# Patient Record
Sex: MALE | Race: ASIAN | NOT HISPANIC OR LATINO
[De-identification: names, ages, dates, MRNs, and addresses within clinical notes are randomized per-mention and may not be internally consistent; named-entity substitution may affect disease eponyms.]

---

## 2019-01-18 ENCOUNTER — RECORD ABSTRACTING (OUTPATIENT)
Age: 34
End: 2019-01-18

## 2019-01-18 DIAGNOSIS — Z82.49 FAMILY HISTORY OF ISCHEMIC HEART DISEASE AND OTHER DISEASES OF THE CIRCULATORY SYSTEM: ICD-10-CM

## 2019-01-18 DIAGNOSIS — Z83.3 FAMILY HISTORY OF DIABETES MELLITUS: ICD-10-CM

## 2019-01-21 ENCOUNTER — APPOINTMENT (OUTPATIENT)
Dept: ENDOCRINOLOGY | Facility: CLINIC | Age: 34
End: 2019-01-21
Payer: COMMERCIAL

## 2019-01-21 VITALS
SYSTOLIC BLOOD PRESSURE: 120 MMHG | BODY MASS INDEX: 27.3 KG/M2 | DIASTOLIC BLOOD PRESSURE: 70 MMHG | WEIGHT: 206 LBS | HEIGHT: 73 IN | HEART RATE: 80 BPM

## 2019-01-21 DIAGNOSIS — Z78.9 OTHER SPECIFIED HEALTH STATUS: ICD-10-CM

## 2019-01-21 LAB — GLUCOSE BLDC GLUCOMTR-MCNC: 121

## 2019-01-21 PROCEDURE — 95251 CONT GLUC MNTR ANALYSIS I&R: CPT

## 2019-01-21 PROCEDURE — 99214 OFFICE O/P EST MOD 30 MIN: CPT | Mod: 25

## 2019-01-21 PROCEDURE — 82962 GLUCOSE BLOOD TEST: CPT

## 2019-01-21 NOTE — HISTORY OF PRESENT ILLNESS
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 \par prior pt of Dr Mendoza last seen him 8/2018\par "do not yell at me" per pt "I have been bad" \par        Most recent A1c up to 7.9 from 7.2%\par        Regimen: \par        Lantus 24 units SC QHS\par        metformin ER 500mg BID\par        Humalog only with bkfst 120 takes 6units\par 160 takes 1unit per 10 over 130\par dinner 8-16units same sliding scale \par        Has changed diet and cut out carb and starches. Only starch mainly in the morning with coffee and breakfast cookie. \par        Wearing Free Style lynda\par        Average Glucose 159 higher\par from freestyle lynda download has highs at 200-250 after breakfast has a cookie with breakfast every morning\par had rare lows after dinner overestimating CHO for dinner per pt \par seen Nutritionist Princess Hernandez, however does not count carbs , \par \par        Microvascular complications: \par        Nephropathy Cr 0.8, EGFR >60, wnl\par        Neuropathy symptoms, denies\par        microfilament exam Sees Dr. Shaikh\par        Retinopathy. Sees Dr. [facility] in [place] denies DR\par        Macrovascular complications: \par        HTN at goal on Ramipril. ASA 81mg PO Qdialy. \par        CAD/CVA denies \par        Lipids 69 on Crestor 10mg qhs with LFT's normal \par        thyroid US 9/15 no nodules reviewed\par

## 2019-01-21 NOTE — PHYSICAL EXAM
[Alert] : alert [No Acute Distress] : no acute distress [Well Nourished] : well nourished [Well Developed] : well developed [Normal Sclera/Conjunctiva] : normal sclera/conjunctiva [EOMI] : extra ocular movement intact [No Proptosis] : no proptosis [Normal Oropharynx] : the oropharynx was normal [Thyroid Not Enlarged] : the thyroid was not enlarged [No Thyroid Nodules] : there were no palpable thyroid nodules [No Respiratory Distress] : no respiratory distress [No Accessory Muscle Use] : no accessory muscle use [Clear to Auscultation] : lungs were clear to auscultation bilaterally [Normal Rate] : heart rate was normal  [Normal S1, S2] : normal S1 and S2 [Regular Rhythm] : with a regular rhythm [Pedal Pulses Normal] : the pedal pulses are present [No Edema] : there was no peripheral edema [Normal Bowel Sounds] : normal bowel sounds [Not Tender] : non-tender [Soft] : abdomen soft [Not Distended] : not distended [Post Cervical Nodes] : posterior cervical nodes [Anterior Cervical Nodes] : anterior cervical nodes [Axillary Nodes] : axillary nodes [No Spinal Tenderness] : no spinal tenderness [Spine Straight] : spine straight [No Stigmata of Cushings Syndrome] : no stigmata of cushings syndrome [Normal Gait] : normal gait [Normal Strength/Tone] : muscle strength and tone were normal [No Rash] : no rash [Right Foot Was Examined] : right foot ~C was examined [Left Foot Was Examined] : left foot ~C was examined [Normal] : normal [Full ROM] : with full range of motion [2+] : 2+ in the dorsalis pedis [Normal Reflexes] : deep tendon reflexes were 2+ and symmetric [No Tremors] : no tremors [Oriented x3] : oriented to person, place, and time [Acanthosis Nigricans] : no acanthosis nigricans [Diminished Throughout Both Feet] : normal tactile sensation with monofilament testing throughout both feet

## 2019-01-21 NOTE — ASSESSMENT
[FreeTextEntry1] : see freestyle lynda information downloaded and scanned for our interpretation, reviewed with the pt

## 2019-01-28 ENCOUNTER — RX RENEWAL (OUTPATIENT)
Age: 34
End: 2019-01-28

## 2019-03-20 ENCOUNTER — MEDICATION RENEWAL (OUTPATIENT)
Age: 34
End: 2019-03-20

## 2019-04-09 ENCOUNTER — APPOINTMENT (OUTPATIENT)
Dept: INTERNAL MEDICINE | Facility: CLINIC | Age: 34
End: 2019-04-09
Payer: COMMERCIAL

## 2019-04-09 VITALS
BODY MASS INDEX: 25.31 KG/M2 | OXYGEN SATURATION: 98 % | TEMPERATURE: 98.6 F | SYSTOLIC BLOOD PRESSURE: 120 MMHG | HEIGHT: 73 IN | WEIGHT: 191 LBS | DIASTOLIC BLOOD PRESSURE: 80 MMHG

## 2019-04-09 PROCEDURE — 99395 PREV VISIT EST AGE 18-39: CPT | Mod: 25

## 2019-04-09 PROCEDURE — 93000 ELECTROCARDIOGRAM COMPLETE: CPT

## 2019-04-09 PROCEDURE — 99406 BEHAV CHNG SMOKING 3-10 MIN: CPT | Mod: 25

## 2019-04-09 PROCEDURE — 36415 COLL VENOUS BLD VENIPUNCTURE: CPT

## 2019-04-09 NOTE — PHYSICAL EXAM
[No Acute Distress] : no acute distress [Well Developed] : well developed [Well Nourished] : well nourished [Well-Appearing] : well-appearing [Normal Sclera/Conjunctiva] : normal sclera/conjunctiva [PERRL] : pupils equal round and reactive to light [EOMI] : extraocular movements intact [Normal Outer Ear/Nose] : the outer ears and nose were normal in appearance [Normal Oropharynx] : the oropharynx was normal [No JVD] : no jugular venous distention [No Lymphadenopathy] : no lymphadenopathy [Supple] : supple [Thyroid Normal, No Nodules] : the thyroid was normal and there were no nodules present [No Respiratory Distress] : no respiratory distress  [Clear to Auscultation] : lungs were clear to auscultation bilaterally [No Accessory Muscle Use] : no accessory muscle use [Normal Rate] : normal rate  [Regular Rhythm] : with a regular rhythm [Normal S1, S2] : normal S1 and S2 [No Murmur] : no murmur heard [No Carotid Bruits] : no carotid bruits [No Abdominal Bruit] : a ~M bruit was not heard ~T in the abdomen [Pedal Pulses Present] : the pedal pulses are present [No Varicosities] : no varicosities [No Edema] : there was no peripheral edema [No Extremity Clubbing/Cyanosis] : no extremity clubbing/cyanosis [Soft] : abdomen soft [No Palpable Aorta] : no palpable aorta [Non-distended] : non-distended [Non Tender] : non-tender [No Masses] : no abdominal mass palpated [No HSM] : no HSM [Normal Posterior Cervical Nodes] : no posterior cervical lymphadenopathy [Normal Bowel Sounds] : normal bowel sounds [Normal Anterior Cervical Nodes] : no anterior cervical lymphadenopathy [No Joint Swelling] : no joint swelling [No CVA Tenderness] : no CVA  tenderness [No Spinal Tenderness] : no spinal tenderness [No Rash] : no rash [Grossly Normal Strength/Tone] : grossly normal strength/tone [Coordination Grossly Intact] : coordination grossly intact [No Focal Deficits] : no focal deficits [Normal Gait] : normal gait [Deep Tendon Reflexes (DTR)] : deep tendon reflexes were 2+ and symmetric [Normal Affect] : the affect was normal [Normal Insight/Judgement] : insight and judgment were intact

## 2019-04-10 NOTE — HEALTH RISK ASSESSMENT
[Two or more falls in past year] : Patient reported two or more falls in the past year [HIV Test offered] : HIV Test offered [Hepatitis C test offered] : Hepatitis C test offered [Good] : ~his/her~  mood as  good [] : No [de-identified] : social  [PapSmearDate] : never [MammogramDate] : never [ColonoscopyDate] : few years ago  [BoneDensityDate] : never

## 2019-04-10 NOTE — HISTORY OF PRESENT ILLNESS
[FreeTextEntry1] : physical  [de-identified] : patient presents to the office for an annual exam . He is still a current smoker unmotivated to stop. He reports following endocrine on a regular basis.

## 2019-04-10 NOTE — COUNSELING
[Weight management counseling provided] : Weight management [Healthy eating counseling provided] : healthy eating [Activity counseling provided] : activity [Discussed Risks and Advised to Quit Smoking] : Discussed risks and advised to quit smoking [Discussed Cessation Medication] : cessation medication was discussed [Discussed Cessation Strategies] : cessation strategies were discussed [Quit Smoking] : Quit Smoking [Tobacco Use Cessation Intermediate Greater Than 3 Minutes Up to 10 Minutes] : Tobacco Use Cessation Intermediate Greater Than 3 Minutes Up to 10 Minutes

## 2019-04-16 LAB
25(OH)D3 SERPL-MCNC: 31.4 NG/ML
ALBUMIN SERPL ELPH-MCNC: 5.2 G/DL
ALP BLD-CCNC: 55 U/L
ALT SERPL-CCNC: 36 U/L
ANION GAP SERPL CALC-SCNC: 14 MMOL/L
APPEARANCE: CLEAR
AST SERPL-CCNC: 28 U/L
BACTERIA: NEGATIVE
BASOPHILS # BLD AUTO: 0.04 K/UL
BASOPHILS NFR BLD AUTO: 0.7 %
BILIRUB SERPL-MCNC: 0.3 MG/DL
BILIRUBIN URINE: NEGATIVE
BLOOD URINE: NEGATIVE
BUN SERPL-MCNC: 12 MG/DL
CALCIUM SERPL-MCNC: 9.6 MG/DL
CHLORIDE SERPL-SCNC: 100 MMOL/L
CHOLEST SERPL-MCNC: 160 MG/DL
CHOLEST/HDLC SERPL: 4.3 RATIO
CO2 SERPL-SCNC: 27 MMOL/L
COLOR: YELLOW
CREAT SERPL-MCNC: 1.06 MG/DL
EOSINOPHIL # BLD AUTO: 0.06 K/UL
EOSINOPHIL NFR BLD AUTO: 1 %
ESTIMATED AVERAGE GLUCOSE: 157 MG/DL
GLUCOSE QUALITATIVE U: NEGATIVE
GLUCOSE SERPL-MCNC: 146 MG/DL
HBA1C MFR BLD HPLC: 7.1 %
HCT VFR BLD CALC: 47.5 %
HCV AB SER QL: NONREACTIVE
HCV S/CO RATIO: 0.16 S/CO
HDLC SERPL-MCNC: 37 MG/DL
HGB BLD-MCNC: 14.8 G/DL
HIV1+2 AB SPEC QL IA.RAPID: NONREACTIVE
HYALINE CASTS: 0 /LPF
IMM GRANULOCYTES NFR BLD AUTO: 0.3 %
KETONES URINE: NEGATIVE
LDLC SERPL CALC-MCNC: 72 MG/DL
LEUKOCYTE ESTERASE URINE: NEGATIVE
LYMPHOCYTES # BLD AUTO: 2.01 K/UL
LYMPHOCYTES NFR BLD AUTO: 32.7 %
MAN DIFF?: NORMAL
MCHC RBC-ENTMCNC: 29.7 PG
MCHC RBC-ENTMCNC: 31.2 GM/DL
MCV RBC AUTO: 95.4 FL
MICROSCOPIC-UA: NORMAL
MONOCYTES # BLD AUTO: 0.38 K/UL
MONOCYTES NFR BLD AUTO: 6.2 %
NEUTROPHILS # BLD AUTO: 3.64 K/UL
NEUTROPHILS NFR BLD AUTO: 59.1 %
NITRITE URINE: NEGATIVE
PH URINE: 5.5
PLATELET # BLD AUTO: 239 K/UL
POTASSIUM SERPL-SCNC: 3.9 MMOL/L
PROT SERPL-MCNC: 7.8 G/DL
PROTEIN URINE: NORMAL
RBC # BLD: 4.98 M/UL
RBC # FLD: 12.5 %
RED BLOOD CELLS URINE: 0 /HPF
SODIUM SERPL-SCNC: 141 MMOL/L
SPECIFIC GRAVITY URINE: 1.03
SQUAMOUS EPITHELIAL CELLS: 0 /HPF
T4 SERPL-MCNC: 6.6 UG/DL
TRIGL SERPL-MCNC: 255 MG/DL
TSH SERPL-ACNC: 3.21 UIU/ML
UROBILINOGEN URINE: NORMAL
VIT B12 SERPL-MCNC: 451 PG/ML
WBC # FLD AUTO: 6.15 K/UL
WHITE BLOOD CELLS URINE: 0 /HPF

## 2019-04-29 ENCOUNTER — APPOINTMENT (OUTPATIENT)
Dept: ENDOCRINOLOGY | Facility: CLINIC | Age: 34
End: 2019-04-29

## 2019-05-06 ENCOUNTER — APPOINTMENT (OUTPATIENT)
Dept: ENDOCRINOLOGY | Facility: CLINIC | Age: 34
End: 2019-05-06

## 2019-05-17 ENCOUNTER — APPOINTMENT (OUTPATIENT)
Dept: ENDOCRINOLOGY | Facility: CLINIC | Age: 34
End: 2019-05-17
Payer: COMMERCIAL

## 2019-05-17 VITALS
WEIGHT: 190 LBS | DIASTOLIC BLOOD PRESSURE: 90 MMHG | HEART RATE: 92 BPM | SYSTOLIC BLOOD PRESSURE: 120 MMHG | OXYGEN SATURATION: 97 % | BODY MASS INDEX: 25.18 KG/M2 | HEIGHT: 73 IN

## 2019-05-17 LAB — GLUCOSE BLDC GLUCOMTR-MCNC: 128

## 2019-05-17 PROCEDURE — 95251 CONT GLUC MNTR ANALYSIS I&R: CPT | Mod: 59

## 2019-05-17 PROCEDURE — 82962 GLUCOSE BLOOD TEST: CPT

## 2019-05-17 PROCEDURE — 99215 OFFICE O/P EST HI 40 MIN: CPT | Mod: 25

## 2019-05-17 PROCEDURE — 99406 BEHAV CHNG SMOKING 3-10 MIN: CPT

## 2019-05-17 NOTE — REASON FOR VISIT
[Initial Eval - Existing Diagnosis] : an initial evaluation of an existing diagnosis [Follow-Up: _____] : a [unfilled] follow-up visit

## 2019-05-17 NOTE — ASSESSMENT
[FreeTextEntry1] : CGM data worth       days reviewed \par dated from \par s/o \par reviewed and scanned in the system \par Adv \par discussed with the patient\par

## 2019-05-17 NOTE — DATA REVIEWED
[FreeTextEntry1] : CGM data worth 13      days reviewed \par dated from ma4th 2019- to may 17th 2019 \par s/o good control \par reviewed and scanned in the system \par Adv high protein breakfast avoid simple carbs \par discussed with the patient\par

## 2019-05-28 LAB
25(OH)D3 SERPL-MCNC: 36.6 NG/ML
ALBUMIN SERPL ELPH-MCNC: 5 G/DL
ALP BLD-CCNC: 56 U/L
ALT SERPL-CCNC: 21 U/L
ANION GAP SERPL CALC-SCNC: 14 MMOL/L
AST SERPL-CCNC: 17 U/L
BILIRUB SERPL-MCNC: 0.4 MG/DL
BUN SERPL-MCNC: 14 MG/DL
CALCIUM SERPL-MCNC: 9.6 MG/DL
CHLORIDE SERPL-SCNC: 103 MMOL/L
CHOLEST SERPL-MCNC: 125 MG/DL
CHOLEST/HDLC SERPL: 3.8 RATIO
CO2 SERPL-SCNC: 26 MMOL/L
CREAT SERPL-MCNC: 0.83 MG/DL
CREAT SPEC-SCNC: 172 MG/DL
GLUCOSE SERPL-MCNC: 147 MG/DL
HBA1C MFR BLD HPLC: 7 %
HDLC SERPL-MCNC: 33 MG/DL
LDLC SERPL CALC-MCNC: 70 MG/DL
MICROALBUMIN 24H UR DL<=1MG/L-MCNC: 7.4 MG/DL
MICROALBUMIN/CREAT 24H UR-RTO: 43 MG/G
POTASSIUM SERPL-SCNC: 4.4 MMOL/L
PROT SERPL-MCNC: 7.7 G/DL
SODIUM SERPL-SCNC: 143 MMOL/L
TRIGL SERPL-MCNC: 111 MG/DL

## 2019-08-09 ENCOUNTER — RX RENEWAL (OUTPATIENT)
Age: 34
End: 2019-08-09

## 2019-08-09 ENCOUNTER — MEDICATION RENEWAL (OUTPATIENT)
Age: 34
End: 2019-08-09

## 2019-08-10 ENCOUNTER — LABORATORY RESULT (OUTPATIENT)
Age: 34
End: 2019-08-10

## 2019-08-19 ENCOUNTER — APPOINTMENT (OUTPATIENT)
Dept: ENDOCRINOLOGY | Facility: CLINIC | Age: 34
End: 2019-08-19
Payer: COMMERCIAL

## 2019-08-19 VITALS
BODY MASS INDEX: 25.71 KG/M2 | DIASTOLIC BLOOD PRESSURE: 80 MMHG | WEIGHT: 194 LBS | HEART RATE: 83 BPM | OXYGEN SATURATION: 98 % | SYSTOLIC BLOOD PRESSURE: 140 MMHG | HEIGHT: 73 IN

## 2019-08-19 LAB — GLUCOSE BLDC GLUCOMTR-MCNC: 110

## 2019-08-19 PROCEDURE — 99215 OFFICE O/P EST HI 40 MIN: CPT | Mod: 25

## 2019-08-19 PROCEDURE — 82962 GLUCOSE BLOOD TEST: CPT

## 2019-08-20 NOTE — PHYSICAL EXAM
[No Acute Distress] : no acute distress [Alert] : alert [Well Nourished] : well nourished [Well Developed] : well developed [Normal Sclera/Conjunctiva] : normal sclera/conjunctiva [EOMI] : extra ocular movement intact [No Proptosis] : no proptosis [Normal Oropharynx] : the oropharynx was normal [Thyroid Not Enlarged] : the thyroid was not enlarged [No Thyroid Nodules] : there were no palpable thyroid nodules [No Respiratory Distress] : no respiratory distress [No Accessory Muscle Use] : no accessory muscle use [Clear to Auscultation] : lungs were clear to auscultation bilaterally [Normal Rate] : heart rate was normal  [Regular Rhythm] : with a regular rhythm [Normal S1, S2] : normal S1 and S2 [Pedal Pulses Normal] : the pedal pulses are present [No Edema] : there was no peripheral edema [Not Tender] : non-tender [Normal Bowel Sounds] : normal bowel sounds [Not Distended] : not distended [Post Cervical Nodes] : posterior cervical nodes [Soft] : abdomen soft [Anterior Cervical Nodes] : anterior cervical nodes [Axillary Nodes] : axillary nodes [No Stigmata of Cushings Syndrome] : no stigmata of cushings syndrome [No Spinal Tenderness] : no spinal tenderness [Spine Straight] : spine straight [Normal Strength/Tone] : muscle strength and tone were normal [Normal Gait] : normal gait [No Rash] : no rash [Left Foot Was Examined] : left foot ~C was examined [Right Foot Was Examined] : right foot ~C was examined [Normal] : normal [Full ROM] : with full range of motion [2+] : 2+ in the dorsalis pedis [Normal Reflexes] : deep tendon reflexes were 2+ and symmetric [No Tremors] : no tremors [Oriented x3] : oriented to person, place, and time [Acanthosis Nigricans] : no acanthosis nigricans [Vibration Dec.] : normal vibratory sensation at the level of the toes [Position Sense Dec.] : normal position sense at the level of the toes [Diminished Throughout Both Feet] : normal tactile sensation with monofilament testing throughout both feet [Normal Sensation on Monofilament Testing] : normal sensation on monofilament testing of lower extremities

## 2019-08-20 NOTE — HISTORY OF PRESENT ILLNESS
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 Only starch mainly in the morning with coffee and breakfast cookie. \par        Wearing Free Style lynda\par        Average Glucose 159 higher\par from freestyle lynda download has highs at 200-250 after breakfast has a cookie with breakfast every morning\par had rare lows after dinner overestimating CHO for dinner per pt \par seen Nutritionist Princess Hernandez, however does not count carbs , \par \par        Microvascular complications: \par        Nephropathy Cr 0.8, EGFR >60, wnl\par        Neuropathy symptoms, denies\par        microfilament exam Sees Dr. Shaikh\par        Retinopathy yes Sees Dr. [facility] in [place] denies DR\par        Macrovascular complications: \par        HTN at goal on Ramipril. ASA 81mg PO Qdialy. \par        CAD/CVA denies \par        Lipids 69 on Crestor 10mg qhs with LFT's normal \par        thyroid US 9/15 no nodules reviewed\par \par \par \par   \par \par \par \par \par

## 2019-09-05 ENCOUNTER — RX RENEWAL (OUTPATIENT)
Age: 34
End: 2019-09-05

## 2019-10-03 ENCOUNTER — RX RENEWAL (OUTPATIENT)
Age: 34
End: 2019-10-03

## 2019-10-04 ENCOUNTER — RX RENEWAL (OUTPATIENT)
Age: 34
End: 2019-10-04

## 2019-10-07 ENCOUNTER — RX RENEWAL (OUTPATIENT)
Age: 34
End: 2019-10-07

## 2019-11-01 ENCOUNTER — RESULT REVIEW (OUTPATIENT)
Age: 34
End: 2019-11-01

## 2019-11-20 ENCOUNTER — LABORATORY RESULT (OUTPATIENT)
Age: 34
End: 2019-11-20

## 2019-11-25 ENCOUNTER — APPOINTMENT (OUTPATIENT)
Dept: ENDOCRINOLOGY | Facility: CLINIC | Age: 34
End: 2019-11-25
Payer: COMMERCIAL

## 2019-11-25 VITALS — BODY MASS INDEX: 25.46 KG/M2 | WEIGHT: 193 LBS

## 2019-11-25 VITALS
DIASTOLIC BLOOD PRESSURE: 80 MMHG | HEIGHT: 73 IN | SYSTOLIC BLOOD PRESSURE: 122 MMHG | HEART RATE: 101 BPM | OXYGEN SATURATION: 95 %

## 2019-11-25 LAB — GLUCOSE BLDC GLUCOMTR-MCNC: 168

## 2019-11-25 PROCEDURE — 99214 OFFICE O/P EST MOD 30 MIN: CPT | Mod: 25

## 2019-11-25 PROCEDURE — 82962 GLUCOSE BLOOD TEST: CPT

## 2019-12-06 NOTE — HISTORY OF PRESENT ILLNESS
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 Only starch mainly in the morning with coffee and breakfast cookie. \par        Wearing Free Style lynda\par        Average Glucose 159 higher\par from freestyle lynda download has highs at 200-250 after breakfast has a cookie with breakfast every morning\par had rare lows after dinner overestimating CHO for dinner per pt \par seen Nutritionist Princess Hernandez, however does not count carbs , \par \par        Microvascular complications: \par        Nephropathy Cr 0.8, EGFR >60, wnl\par        Neuropathy symptoms, denies\par        microfilament exam Sees Dr. Shaikh\par        Retinopathy yes Sees Dr. [facility] in [place] denies DR\par        Macrovascular complications: \par        HTN at goal on Ramipril. ASA 81mg PO Qdialy. \par        CAD/CVA denies \par        Lipids 69 on Crestor 10mg qhs with LFT's normal \par        thyroid US 9/15 no nodules reviewed\par \par \par \par   \par \par \par \par \par

## 2019-12-12 ENCOUNTER — RX RENEWAL (OUTPATIENT)
Age: 34
End: 2019-12-12

## 2019-12-20 ENCOUNTER — RX RENEWAL (OUTPATIENT)
Age: 34
End: 2019-12-20

## 2020-02-24 ENCOUNTER — APPOINTMENT (OUTPATIENT)
Dept: INTERNAL MEDICINE | Facility: CLINIC | Age: 35
End: 2020-02-24
Payer: COMMERCIAL

## 2020-02-24 VITALS
BODY MASS INDEX: 23.86 KG/M2 | HEIGHT: 73 IN | WEIGHT: 180 LBS | HEART RATE: 82 BPM | OXYGEN SATURATION: 98 % | DIASTOLIC BLOOD PRESSURE: 70 MMHG | SYSTOLIC BLOOD PRESSURE: 100 MMHG

## 2020-02-24 DIAGNOSIS — M25.511 PAIN IN RIGHT SHOULDER: ICD-10-CM

## 2020-02-24 PROCEDURE — 99213 OFFICE O/P EST LOW 20 MIN: CPT

## 2020-02-26 PROBLEM — M25.511 RIGHT SHOULDER PAIN: Status: ACTIVE | Noted: 2020-02-26

## 2020-02-26 NOTE — HISTORY OF PRESENT ILLNESS
[FreeTextEntry8] : right shoulder pain \par \par patient was office today for right shoulder pain. He denies any specific trauma. He is wanting a prescription for physical therapy. He does not want to do imaging at this time

## 2020-02-26 NOTE — PHYSICAL EXAM
[No Acute Distress] : no acute distress [Well Nourished] : well nourished [Well Developed] : well developed [Well-Appearing] : well-appearing [Normal Sclera/Conjunctiva] : normal sclera/conjunctiva [PERRL] : pupils equal round and reactive to light [EOMI] : extraocular movements intact [Normal Outer Ear/Nose] : the outer ears and nose were normal in appearance [Normal Oropharynx] : the oropharynx was normal [No JVD] : no jugular venous distention [No Lymphadenopathy] : no lymphadenopathy [Supple] : supple [Thyroid Normal, No Nodules] : the thyroid was normal and there were no nodules present [No Respiratory Distress] : no respiratory distress  [No Accessory Muscle Use] : no accessory muscle use [Clear to Auscultation] : lungs were clear to auscultation bilaterally [Normal Rate] : normal rate  [Regular Rhythm] : with a regular rhythm [Normal S1, S2] : normal S1 and S2 [No Murmur] : no murmur heard [No Carotid Bruits] : no carotid bruits [No Abdominal Bruit] : a ~M bruit was not heard ~T in the abdomen [No Varicosities] : no varicosities [Pedal Pulses Present] : the pedal pulses are present [No Edema] : there was no peripheral edema [No Palpable Aorta] : no palpable aorta [No Extremity Clubbing/Cyanosis] : no extremity clubbing/cyanosis [Soft] : abdomen soft [Non Tender] : non-tender [Non-distended] : non-distended [No Masses] : no abdominal mass palpated [No HSM] : no HSM [Normal Bowel Sounds] : normal bowel sounds [Normal Posterior Cervical Nodes] : no posterior cervical lymphadenopathy [Normal Anterior Cervical Nodes] : no anterior cervical lymphadenopathy [No CVA Tenderness] : no CVA  tenderness [No Spinal Tenderness] : no spinal tenderness [No Joint Swelling] : no joint swelling [Grossly Normal Strength/Tone] : grossly normal strength/tone [No Rash] : no rash [Coordination Grossly Intact] : coordination grossly intact [No Focal Deficits] : no focal deficits [Normal Gait] : normal gait [Deep Tendon Reflexes (DTR)] : deep tendon reflexes were 2+ and symmetric [Normal Affect] : the affect was normal [Normal Insight/Judgement] : insight and judgment were intact [de-identified] : mild pain when lifting right shoulder above 90 degrees

## 2020-02-26 NOTE — PLAN
[FreeTextEntry1] :  prescription for physical therapy for right shoulder pain. If physical therapy is not successful will consider doing imaging and or orthopedic referral.

## 2020-03-03 ENCOUNTER — RX RENEWAL (OUTPATIENT)
Age: 35
End: 2020-03-03

## 2020-03-16 ENCOUNTER — APPOINTMENT (OUTPATIENT)
Dept: ENDOCRINOLOGY | Facility: CLINIC | Age: 35
End: 2020-03-16
Payer: COMMERCIAL

## 2020-03-16 VITALS
DIASTOLIC BLOOD PRESSURE: 70 MMHG | WEIGHT: 183 LBS | SYSTOLIC BLOOD PRESSURE: 120 MMHG | BODY MASS INDEX: 24.25 KG/M2 | HEIGHT: 73 IN

## 2020-03-16 LAB
ALBUMIN SERPL ELPH-MCNC: 5.3 G/DL
ALP BLD-CCNC: 60 U/L
ALT SERPL-CCNC: 36 U/L
ANION GAP SERPL CALC-SCNC: 15 MMOL/L
AST SERPL-CCNC: 29 U/L
BILIRUB SERPL-MCNC: 0.6 MG/DL
BUN SERPL-MCNC: 12 MG/DL
CALCIUM SERPL-MCNC: 9.6 MG/DL
CHLORIDE SERPL-SCNC: 98 MMOL/L
CHOLEST SERPL-MCNC: 148 MG/DL
CHOLEST/HDLC SERPL: 3.4 RATIO
CO2 SERPL-SCNC: 28 MMOL/L
CREAT SERPL-MCNC: 0.92 MG/DL
CREAT SPEC-SCNC: 245 MG/DL
ESTIMATED AVERAGE GLUCOSE: 154 MG/DL
GLUCOSE BLDC GLUCOMTR-MCNC: 141
GLUCOSE SERPL-MCNC: 150 MG/DL
HBA1C MFR BLD HPLC: 7 %
HDLC SERPL-MCNC: 44 MG/DL
LDLC SERPL CALC-MCNC: 86 MG/DL
MICROALBUMIN 24H UR DL<=1MG/L-MCNC: 10.3 MG/DL
MICROALBUMIN/CREAT 24H UR-RTO: 42 MG/G
POTASSIUM SERPL-SCNC: 3.8 MMOL/L
PROT SERPL-MCNC: 8 G/DL
SODIUM SERPL-SCNC: 140 MMOL/L
TRIGL SERPL-MCNC: 88 MG/DL
TSH SERPL-ACNC: 2.37 UIU/ML

## 2020-03-16 PROCEDURE — 82962 GLUCOSE BLOOD TEST: CPT

## 2020-03-16 PROCEDURE — 95251 CONT GLUC MNTR ANALYSIS I&R: CPT

## 2020-03-16 PROCEDURE — 99214 OFFICE O/P EST MOD 30 MIN: CPT | Mod: 25

## 2020-03-16 NOTE — ASSESSMENT
[Long Term Vascular Complications] : long term vascular complications of diabetes [Importance of Diet and Exercise] : importance of diet and exercise to improve glycemic control, achieve weight loss and improve cardiovascular health [Action and use of Insulin] : action and use of short and long-acting insulin

## 2020-03-16 NOTE — HISTORY OF PRESENT ILLNESS
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[employer], coming for f/u OBED, with + Microal/creat ratio, mild BDR sees [facility] \par \par     last A1c 7.1 , stable from 11/19 but mildly up from 7 4/19\par        Regimen: \par        Lantus 18 units SC QHS did not take it for 2 weeks as he is afraid to take Humalog and Lantus same time, works nighgts on and off per pt \par        metformin ER 2 tab twice a day 500mg \par Humalog SS\par        for sugar under 80 do not take Humalog\par       for sugar before meal  take one unit per 10g carbohydrates for breakfast (suppose to try 7 but he just changed diet instead per pt) and keep 1:10 for lunch and dinner \par       131-170 add one unit of Humalog\par       171-210 add 2units Humalog\par       211-250 add 3units\par       251-290 add 4units\par       291-330 add 5units Humalog\par \par        Wearing Free Style lynda\par        Average Glucose 159 higher\par from freestyle lynda download has highs at 200-250 after breakfast has a cookie with breakfast every morning\par had rare lows after dinner overestimating CHO for dinner per pt \par seen Nutritionist Princess Hernandez, however does not count carbs , \par \par        Microvascular complications: \par        Nephropathy Cr 0.8, EGFR >60, wnl\par        Neuropathy symptoms, denies\par        microfilament exam Sees Dr. Shaikh\par        Retinopathy yes Sees [facility]. [facility] in [place] denies DR\par        Macrovascular complications: \par        HTN at goal on Ramipril. ASA 81mg PO Qdialy. \par        CAD/CVA denies \par        Lipids 69 on Crestor 10mg qhs with LFT's normal \par        thyroid US 9/15 no nodules reviewed\par \par \par \par   \par \par \par \par \par

## 2020-03-17 ENCOUNTER — RX RENEWAL (OUTPATIENT)
Age: 35
End: 2020-03-17

## 2020-04-03 ENCOUNTER — RX RENEWAL (OUTPATIENT)
Age: 35
End: 2020-04-03

## 2020-04-26 ENCOUNTER — MESSAGE (OUTPATIENT)
Age: 35
End: 2020-04-26

## 2020-05-12 LAB
SARS-COV-2 IGG SERPL IA-ACNC: 0 INDEX
SARS-COV-2 IGG SERPL QL IA: NEGATIVE

## 2020-07-15 ENCOUNTER — RX RENEWAL (OUTPATIENT)
Age: 35
End: 2020-07-15

## 2020-08-20 ENCOUNTER — APPOINTMENT (OUTPATIENT)
Dept: ENDOCRINOLOGY | Facility: CLINIC | Age: 35
End: 2020-08-20

## 2020-08-20 LAB
25(OH)D3 SERPL-MCNC: 34.7 NG/ML
ALBUMIN SERPL ELPH-MCNC: 4.9 G/DL
ALP BLD-CCNC: 64 U/L
ALT SERPL-CCNC: 61 U/L
ANION GAP SERPL CALC-SCNC: 16 MMOL/L
AST SERPL-CCNC: 28 U/L
BILIRUB SERPL-MCNC: 0.4 MG/DL
BUN SERPL-MCNC: 14 MG/DL
CALCIUM SERPL-MCNC: 9.7 MG/DL
CHLORIDE SERPL-SCNC: 102 MMOL/L
CHOLEST SERPL-MCNC: 177 MG/DL
CHOLEST/HDLC SERPL: 4.2 RATIO
CO2 SERPL-SCNC: 22 MMOL/L
CREAT SERPL-MCNC: 0.87 MG/DL
CREAT SPEC-SCNC: 113 MG/DL
ESTIMATED AVERAGE GLUCOSE: 166 MG/DL
GLUCOSE SERPL-MCNC: 163 MG/DL
HBA1C MFR BLD HPLC: 7.4 %
HDLC SERPL-MCNC: 42 MG/DL
LDLC SERPL CALC-MCNC: 96 MG/DL
MICROALBUMIN 24H UR DL<=1MG/L-MCNC: <1.2 MG/DL
MICROALBUMIN/CREAT 24H UR-RTO: NORMAL MG/G
POTASSIUM SERPL-SCNC: 4.9 MMOL/L
PROT SERPL-MCNC: 7.4 G/DL
SODIUM SERPL-SCNC: 140 MMOL/L
TRIGL SERPL-MCNC: 194 MG/DL

## 2020-08-20 RX ORDER — INSULIN GLARGINE 100 [IU]/ML
100 INJECTION, SOLUTION SUBCUTANEOUS AT BEDTIME
Qty: 3 | Refills: 2 | Status: DISCONTINUED | COMMUNITY
Start: 2019-10-04 | End: 2020-08-20

## 2020-08-20 NOTE — ASSESSMENT
[Long Term Vascular Complications] : long term vascular complications of diabetes [Diabetes Foot Care] : diabetes foot care [Importance of Diet and Exercise] : importance of diet and exercise to improve glycemic control, achieve weight loss and improve cardiovascular health [Hypoglycemia Management] : hypoglycemia management [Retinopathy Screening] : Patient was referred to ophthalmology for retinopathy screening

## 2020-08-20 NOTE — HISTORY OF PRESENT ILLNESS
[Verbal consent obtained from patient] : the patient, [unfilled] [Home] : at home, [unfilled] , at the time of the visit. 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[employer], coming for f/u OBED, with + Microal/creat ratio, mild BDR sees [facility] \par \par     last A1c 7.1 , stable from 11/19 but mildly up from 7 4/19\par        Regimen: \par        Lantus 15 units SC QHS did not take it for 2 weeks as he is afraid to take Humalog and Lantus same time, works nighgts on and off per pt \par        metformin ER 2 tab twice a day 500mg \par Humalog SS\par        for sugar under 80 do not take Humalog\par       for sugar before meal  take one unit per 10g carbohydrates for breakfast (suppose to try 7 but he just changed diet instead per pt) and keep 1:10 for lunch and dinner \par       131-170 add one unit of Humalog\par       171-210 add 2units Humalog\par       211-250 add 3units\par       251-290 add 4units\par       291-330 add 5units Humalog\par \par ends up with Humalog breakfast 5......0....5-6\par \par        Wearing Free Style lynda\par        Average Glucose 159 higher\par from freestyle lynda download has highs at 200-250 after breakfast has a cookie with breakfast every morning\par had rare lows after dinner overestimating CHO for dinner per pt \par seen Nutritionist Princess Hernandez, however does not count carbs , \par \par        Microvascular complications: \par        Nephropathy Cr 0.8, EGFR >60, wnl\par        Neuropathy symptoms, denies\par        microfilament exam Sees Dr. Shaikh\par        Retinopathy yes Sees Dr. [facility] in [place] denies DR\par        Macrovascular complications: \par        HTN at goal on Ramipril. ASA 81mg PO Qdialy. \par        CAD/CVA denies \par        Lipids 69 on Crestor 10mg qhs with LFT's normal \par        thyroid US 9/15 no nodules reviewed\par \par \par \par   \par \par \par \par \par

## 2020-09-14 ENCOUNTER — RX RENEWAL (OUTPATIENT)
Age: 35
End: 2020-09-14

## 2021-04-19 ENCOUNTER — RX RENEWAL (OUTPATIENT)
Age: 36
End: 2021-04-19

## 2021-05-17 ENCOUNTER — APPOINTMENT (OUTPATIENT)
Dept: ENDOCRINOLOGY | Facility: CLINIC | Age: 36
End: 2021-05-17
Payer: COMMERCIAL

## 2021-05-17 VITALS
HEART RATE: 82 BPM | SYSTOLIC BLOOD PRESSURE: 120 MMHG | HEIGHT: 73 IN | BODY MASS INDEX: 23.86 KG/M2 | WEIGHT: 180 LBS | DIASTOLIC BLOOD PRESSURE: 70 MMHG | OXYGEN SATURATION: 96 %

## 2021-05-17 LAB
24R-OH-CALCIDIOL SERPL-MCNC: 75.1 PG/ML
25(OH)D3 SERPL-MCNC: 45.4 NG/ML
ALBUMIN SERPL ELPH-MCNC: 5.1 G/DL
ALP BLD-CCNC: 58 U/L
ALT SERPL-CCNC: 37 U/L
ANION GAP SERPL CALC-SCNC: 16 MMOL/L
AST SERPL-CCNC: 32 U/L
BILIRUB SERPL-MCNC: 0.5 MG/DL
BUN SERPL-MCNC: 18 MG/DL
CALCIUM SERPL-MCNC: 9.6 MG/DL
CHLORIDE SERPL-SCNC: 102 MMOL/L
CHOLEST SERPL-MCNC: 139 MG/DL
CO2 SERPL-SCNC: 23 MMOL/L
CREAT SERPL-MCNC: 0.98 MG/DL
CREAT SPEC-SCNC: 239 MG/DL
ESTIMATED AVERAGE GLUCOSE: 140 MG/DL
GLUCOSE BLDC GLUCOMTR-MCNC: 122
GLUCOSE SERPL-MCNC: 120 MG/DL
HBA1C MFR BLD HPLC: 6.5 %
HDLC SERPL-MCNC: 45 MG/DL
LDLC SERPL CALC-MCNC: 82 MG/DL
MICROALBUMIN 24H UR DL<=1MG/L-MCNC: 2.5 MG/DL
MICROALBUMIN/CREAT 24H UR-RTO: 11 MG/G
NONHDLC SERPL-MCNC: 95 MG/DL
POTASSIUM SERPL-SCNC: 5.3 MMOL/L
PROT SERPL-MCNC: 7.6 G/DL
SODIUM SERPL-SCNC: 141 MMOL/L
TRIGL SERPL-MCNC: 63 MG/DL

## 2021-05-17 PROCEDURE — 82962 GLUCOSE BLOOD TEST: CPT

## 2021-05-17 PROCEDURE — 95251 CONT GLUC MNTR ANALYSIS I&R: CPT

## 2021-05-17 PROCEDURE — 99072 ADDL SUPL MATRL&STAF TM PHE: CPT

## 2021-05-17 PROCEDURE — 99215 OFFICE O/P EST HI 40 MIN: CPT

## 2021-05-17 RX ORDER — BUPROPION HYDROCHLORIDE 150 MG/1
150 TABLET, EXTENDED RELEASE ORAL DAILY
Qty: 90 | Refills: 0 | Status: DISCONTINUED | COMMUNITY
Start: 2019-04-09 | End: 2021-05-17

## 2021-05-22 NOTE — HISTORY OF PRESENT ILLNESS
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[employer], coming for f/u OBED, with + Microal/creat ratio, mild BDR sees [facility] \par \par     last A1c 7.1 , stable from 11/19 but mildly up from 7 4/19\par        Regimen: \par        Lantus 15 units SC QHS did not take it for 2 weeks as he is afraid to take Humalog and Lantus same time, works nighgts on and off per pt \par        metformin ER 2 tab twice a day 500mg \par Humalog SS\par        for sugar under 80 do not take Humalog\par       for sugar before meal  take one unit per 10g carbohydrates for breakfast (suppose to try 7 but he just changed diet instead per pt) and keep 1:10 for lunch and dinner \par       131-170 add one unit of Humalog\par       171-210 add 2units Humalog\par       211-250 add 3units\par       251-290 add 4units\par       291-330 add 5units Humalog\par \par ends up with Humalog breakfast 5......0....5-6\par \par        Wearing Free Style lynda\par        Average Glucose 159 higher\par from freestyle lynda download has highs at 200-250 after breakfast has a cookie with breakfast every morning\par had rare lows after dinner overestimating CHO for dinner per pt \par seen Nutritionist Princess Hernandez, however does not count carbs , \par \par        Microvascular complications: \par        Nephropathy Cr 0.8, EGFR >60, wnl\par        Neuropathy symptoms, denies\par        microfilament exam Sees Dr. Shaikh\par        Retinopathy yes Sees [facility] in [place] denies DR\par        Macrovascular complications: \par        HTN at goal on Ramipril. ASA 81mg PO Qdialy. \par        CAD/CVA denies \par        Lipids 69 on Crestor 10mg qhs with LFT's normal \par        thyroid US 9/15 no nodules reviewed\par \par \par \par   \par \par \par \par \par

## 2021-08-10 ENCOUNTER — NON-APPOINTMENT (OUTPATIENT)
Age: 36
End: 2021-08-10

## 2021-09-14 ENCOUNTER — APPOINTMENT (OUTPATIENT)
Dept: ENDOCRINOLOGY | Facility: CLINIC | Age: 36
End: 2021-09-14
Payer: COMMERCIAL

## 2021-09-14 VITALS
HEART RATE: 86 BPM | WEIGHT: 178 LBS | BODY MASS INDEX: 23.59 KG/M2 | SYSTOLIC BLOOD PRESSURE: 116 MMHG | HEIGHT: 73 IN | DIASTOLIC BLOOD PRESSURE: 70 MMHG | OXYGEN SATURATION: 98 %

## 2021-09-14 DIAGNOSIS — D64.9 ANEMIA, UNSPECIFIED: ICD-10-CM

## 2021-09-14 LAB
ALBUMIN SERPL ELPH-MCNC: 5 G/DL
ALP BLD-CCNC: 57 U/L
ALT SERPL-CCNC: 37 U/L
ANION GAP SERPL CALC-SCNC: 17 MMOL/L
AST SERPL-CCNC: 29 U/L
BILIRUB SERPL-MCNC: 0.2 MG/DL
BUN SERPL-MCNC: 15 MG/DL
CALCIUM SERPL-MCNC: 9.7 MG/DL
CHLORIDE SERPL-SCNC: 100 MMOL/L
CHOLEST SERPL-MCNC: 135 MG/DL
CO2 SERPL-SCNC: 23 MMOL/L
CREAT SERPL-MCNC: 0.93 MG/DL
CREAT SPEC-SCNC: 159 MG/DL
ESTIMATED AVERAGE GLUCOSE: 140 MG/DL
GLUCOSE BLDC GLUCOMTR-MCNC: 110
GLUCOSE SERPL-MCNC: 155 MG/DL
HBA1C MFR BLD HPLC: 6.5 %
HDLC SERPL-MCNC: 38 MG/DL
LDLC SERPL CALC-MCNC: 64 MG/DL
MICROALBUMIN 24H UR DL<=1MG/L-MCNC: 2.4 MG/DL
MICROALBUMIN/CREAT 24H UR-RTO: 15 MG/G
NONHDLC SERPL-MCNC: 97 MG/DL
POTASSIUM SERPL-SCNC: 4.5 MMOL/L
PROT SERPL-MCNC: 7.5 G/DL
SODIUM SERPL-SCNC: 141 MMOL/L
TRIGL SERPL-MCNC: 168 MG/DL
TSH SERPL-ACNC: 2.09 UIU/ML

## 2021-09-14 PROCEDURE — 95251 CONT GLUC MNTR ANALYSIS I&R: CPT

## 2021-09-14 PROCEDURE — 82962 GLUCOSE BLOOD TEST: CPT

## 2021-09-14 PROCEDURE — 99215 OFFICE O/P EST HI 40 MIN: CPT | Mod: 25

## 2021-09-14 RX ORDER — ERGOCALCIFEROL 1.25 MG/1
1.25 MG CAPSULE ORAL
Qty: 13 | Refills: 1 | Status: DISCONTINUED | COMMUNITY
End: 2021-09-14

## 2021-09-14 NOTE — HISTORY OF PRESENT ILLNESS
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[employer], coming for f/u OBED, with + Microal/creat ratio, mild BDR sees [facility] \par \par     last A1c 6.5 , stable from 11/19 but mildly up from 7 4/19\par        Regimen: \par        Tresiba 10 units SC qam around 10am \par        metformin ER 2 tab twice a day 500mg \par Humalog SS\par for sugar under 80 do not take Humalog\par        for sugar before meal  take one unit per 12g carbohydrates for breakfast and\par     keep\par       1:10 for lunch and dinner \par        131-170 add one unit of Humalog\par        171-210 add 2units Humalog\par        211-250 add 3units\par        251-290 add 4units\par        291-330 add 5units Humalog\par \par ends up 0-3...0...0-3 \par \par        Wearing Free Style lynda\par        Average Glucose 159 higher\par from freestyle lynda download has highs at 200-250 after breakfast has a cookie with breakfast every morning\par had rare lows after dinner overestimating CHO for dinner per pt \par seen Nutritionist Princess Hernandez, however does not count carbs , \par \par        Microvascular complications: \par        Nephropathy Cr 0.8, EGFR >60, wnl\par        Neuropathy symptoms, denies\par        microfilament exam Sees Dr. Shaikh\par        Retinopathy yes Sees [facility] in [place] denies DR\par        Macrovascular complications: \par        HTN at goal on Ramipril. ASA 81mg PO Qdialy. \par        CAD/CVA denies \par        Lipids 69 on Crestor 10mg qhs with LFT's normal \par        thyroid US 9/15 no nodules reviewed\par \par \par \par   \par \par \par \par \par

## 2021-09-29 RX ORDER — INSULIN LISPRO 200 [IU]/ML
200 INJECTION, SOLUTION SUBCUTANEOUS
Qty: 3 | Refills: 0 | Status: DISCONTINUED | COMMUNITY
Start: 2020-04-03 | End: 2021-09-29

## 2021-11-17 ENCOUNTER — APPOINTMENT (OUTPATIENT)
Dept: ENDOCRINOLOGY | Facility: CLINIC | Age: 36
End: 2021-11-17
Payer: COMMERCIAL

## 2021-11-17 PROCEDURE — 99441: CPT

## 2021-11-17 PROCEDURE — 95251 CONT GLUC MNTR ANALYSIS I&R: CPT

## 2021-11-17 RX ORDER — INSULIN DEGLUDEC INJECTION 100 U/ML
100 INJECTION, SOLUTION SUBCUTANEOUS
Qty: 1 | Refills: 3 | Status: DISCONTINUED | COMMUNITY
Start: 2020-08-20 | End: 2021-11-17

## 2021-11-17 NOTE — HISTORY OF PRESENT ILLNESS
[Home] : at home, [unfilled] , at the time of the visit. 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[employer], coming for f/u OBED, with + Microal/creat ratio, mild BDR sees [facility] \par \par     last A1c 6.5 , stable from 11/19 but mildly up from 7 4/19\par        Regimen: \par        Tresiba 5 units SC qam\par        metformin ER 2 tab twice a day 500mg \par Humalog SS none recently\par for sugar under 80 do not take Humalog\par        for sugar before meal  take one unit per 12g carbohydrates for breakfast and\par     keep\par       1:10 for lunch and dinner \par        131-170 add one unit of Humalog\par        171-210 add 2units Humalog\par        211-250 add 3units\par        251-290 add 4units\par        291-330 add 5units Humalog\par \par ends up 0-3...0...0-3 \par \par        Wearing Free Style lynda\par        Average Glucose 159 higher\par from freestyle lynda download has highs at 200-250 after breakfast has a cookie with breakfast every morning\par had rare lows after dinner overestimating CHO for dinner per pt \par seen Nutritionist Princess Hernandez, however does not count carbs , \par \par        Microvascular complications: \par        Nephropathy Cr 0.8, EGFR >60, wnl\par        Neuropathy symptoms, denies\par        microfilament exam Sees Dr. Shaikh\par        Retinopathy yes Sees Dr. [facility] in [place] denies DR\par        Macrovascular complications: \par        HTN at goal on Ramipril. ASA 81mg PO Qdialy. \par        CAD/CVA denies \par        Lipids 69 on Crestor 10mg qhs with LFT's normal \par        thyroid US 9/15 no nodules reviewed\par \par \par \par   \par \par \par \par \par

## 2022-01-26 LAB
25(OH)D3 SERPL-MCNC: 33.6 NG/ML
ALBUMIN SERPL ELPH-MCNC: 4.6 G/DL
ALP BLD-CCNC: 52 U/L
ALT SERPL-CCNC: 21 U/L
ANION GAP SERPL CALC-SCNC: 10 MMOL/L
AST SERPL-CCNC: 15 U/L
BASOPHILS # BLD AUTO: 0.06 K/UL
BASOPHILS NFR BLD AUTO: 1.1 %
BILIRUB SERPL-MCNC: 0.2 MG/DL
BUN SERPL-MCNC: 13 MG/DL
CALCIUM SERPL-MCNC: 9.5 MG/DL
CHLORIDE SERPL-SCNC: 104 MMOL/L
CHOLEST SERPL-MCNC: 136 MG/DL
CO2 SERPL-SCNC: 27 MMOL/L
CREAT SERPL-MCNC: 0.93 MG/DL
CREAT SPEC-SCNC: 206 MG/DL
EOSINOPHIL # BLD AUTO: 0.38 K/UL
EOSINOPHIL NFR BLD AUTO: 6.8 %
ESTIMATED AVERAGE GLUCOSE: 148 MG/DL
FERRITIN SERPL-MCNC: 137 NG/ML
FOLATE SERPL-MCNC: 11.1 NG/ML
GLUCOSE SERPL-MCNC: 146 MG/DL
HBA1C MFR BLD HPLC: 6.8 %
HCT VFR BLD CALC: 45.2 %
HDLC SERPL-MCNC: 43 MG/DL
HGB BLD-MCNC: 14.3 G/DL
IMM GRANULOCYTES NFR BLD AUTO: 0.2 %
IRON SATN MFR SERPL: 24 %
IRON SERPL-MCNC: 77 UG/DL
LDLC SERPL CALC-MCNC: 73 MG/DL
LYMPHOCYTES # BLD AUTO: 2.09 K/UL
LYMPHOCYTES NFR BLD AUTO: 37.7 %
MAN DIFF?: NORMAL
MCHC RBC-ENTMCNC: 29.9 PG
MCHC RBC-ENTMCNC: 31.6 GM/DL
MCV RBC AUTO: 94.4 FL
MICROALBUMIN 24H UR DL<=1MG/L-MCNC: 4.1 MG/DL
MICROALBUMIN/CREAT 24H UR-RTO: 20 MG/G
MONOCYTES # BLD AUTO: 0.41 K/UL
MONOCYTES NFR BLD AUTO: 7.4 %
NEUTROPHILS # BLD AUTO: 2.6 K/UL
NEUTROPHILS NFR BLD AUTO: 46.8 %
NONHDLC SERPL-MCNC: 93 MG/DL
PLATELET # BLD AUTO: 218 K/UL
POTASSIUM SERPL-SCNC: 4.1 MMOL/L
PROT SERPL-MCNC: 7 G/DL
RBC # BLD: 4.79 M/UL
RBC # FLD: 12.2 %
SODIUM SERPL-SCNC: 141 MMOL/L
TIBC SERPL-MCNC: 325 UG/DL
TRIGL SERPL-MCNC: 99 MG/DL
UIBC SERPL-MCNC: 248 UG/DL
VIT B12 SERPL-MCNC: 424 PG/ML
WBC # FLD AUTO: 5.55 K/UL

## 2022-01-27 ENCOUNTER — APPOINTMENT (OUTPATIENT)
Dept: ENDOCRINOLOGY | Facility: CLINIC | Age: 37
End: 2022-01-27
Payer: COMMERCIAL

## 2022-01-27 VITALS
HEIGHT: 73 IN | DIASTOLIC BLOOD PRESSURE: 70 MMHG | BODY MASS INDEX: 22.53 KG/M2 | SYSTOLIC BLOOD PRESSURE: 110 MMHG | HEART RATE: 90 BPM | WEIGHT: 170 LBS

## 2022-01-27 LAB — GLUCOSE BLDC GLUCOMTR-MCNC: 168

## 2022-01-27 PROCEDURE — 82962 GLUCOSE BLOOD TEST: CPT

## 2022-01-27 PROCEDURE — 99215 OFFICE O/P EST HI 40 MIN: CPT

## 2022-01-27 NOTE — HISTORY OF PRESENT ILLNESS
[Home] : at home, [unfilled] , at the time of the visit. 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 \par       Is counting carbs after seeing nutritionist Evon Hernandez. \par \par \par        Microvascular complications: \par        Nephropathy Cr 0.93, EGFR >60, wnl\par        Neuropathy symptoms, denies\par        Sees Dr. Shaikh podiatrist\par        Retinopathy yes Sees Dr. [facility] in [place] denies  Last visit 1/24\par        Macrovascular complications: \par        HTN at goal on Ramipril. \par        Will start ASA 81mg PO QDaily. \par        CAD/CVA denies \par        Lipids WNL on Crestor 10mg qhs with LFT's normal \par        thyroid US 9/15 no nodules reviewed\par \par \par \par   \par \par \par \par \par

## 2022-05-09 ENCOUNTER — APPOINTMENT (OUTPATIENT)
Dept: ENDOCRINOLOGY | Facility: CLINIC | Age: 37
End: 2022-05-09
Payer: COMMERCIAL

## 2022-05-09 VITALS
HEART RATE: 71 BPM | OXYGEN SATURATION: 98 % | WEIGHT: 174 LBS | BODY MASS INDEX: 23.06 KG/M2 | SYSTOLIC BLOOD PRESSURE: 122 MMHG | DIASTOLIC BLOOD PRESSURE: 70 MMHG | HEIGHT: 73 IN

## 2022-05-09 DIAGNOSIS — R07.9 CHEST PAIN, UNSPECIFIED: ICD-10-CM

## 2022-05-09 DIAGNOSIS — F17.200 NICOTINE DEPENDENCE, UNSPECIFIED, UNCOMPLICATED: ICD-10-CM

## 2022-05-09 LAB
ALBUMIN SERPL ELPH-MCNC: 4.9 G/DL
ALP BLD-CCNC: 56 U/L
ALT SERPL-CCNC: 23 U/L
ANION GAP SERPL CALC-SCNC: 13 MMOL/L
AST SERPL-CCNC: 21 U/L
BILIRUB SERPL-MCNC: 0.3 MG/DL
BUN SERPL-MCNC: 20 MG/DL
CALCIUM SERPL-MCNC: 9.7 MG/DL
CHLORIDE SERPL-SCNC: 101 MMOL/L
CHOLEST SERPL-MCNC: 130 MG/DL
CO2 SERPL-SCNC: 26 MMOL/L
CREAT SERPL-MCNC: 0.91 MG/DL
EGFR: 111 ML/MIN/1.73M2
ESTIMATED AVERAGE GLUCOSE: 140 MG/DL
GLUCOSE BLDC GLUCOMTR-MCNC: 107
GLUCOSE SERPL-MCNC: 133 MG/DL
HBA1C MFR BLD HPLC: 6.5 %
HDLC SERPL-MCNC: 48 MG/DL
LDLC SERPL CALC-MCNC: 71 MG/DL
NONHDLC SERPL-MCNC: 82 MG/DL
POTASSIUM SERPL-SCNC: 4.8 MMOL/L
PROT SERPL-MCNC: 7.3 G/DL
SODIUM SERPL-SCNC: 140 MMOL/L
TRIGL SERPL-MCNC: 57 MG/DL

## 2022-05-09 PROCEDURE — 82962 GLUCOSE BLOOD TEST: CPT

## 2022-05-09 PROCEDURE — 99215 OFFICE O/P EST HI 40 MIN: CPT | Mod: 25

## 2022-05-09 RX ORDER — FLASH GLUCOSE SCANNING READER
EACH MISCELLANEOUS
Qty: 1 | Refills: 0 | Status: ACTIVE | COMMUNITY
Start: 2022-05-09 | End: 1900-01-01

## 2022-05-09 RX ORDER — FLASH GLUCOSE SENSOR
KIT MISCELLANEOUS
Qty: 6 | Refills: 1 | Status: DISCONTINUED | COMMUNITY
Start: 2019-01-28 | End: 2022-05-09

## 2022-05-09 NOTE — HISTORY OF PRESENT ILLNESS
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[employer], coming for f/u OBED, with + Microal/creat ratio, mild BDR sees [facility] \par \par     last A1c better at 6.5 he had to restart Humalog before each meal after he went to his brother birthday party and had more sugar than his blood sugar went as high as 200 after meals\par        Regimen: \par        metformin ER 2 tab twice a day 500mg \par Tresiba none\par Humalog 5-7units/24hrs\par 1-2......1unit......0-2\par \par \par        Wearing Free Style lynda\par        Average Glucose 118 GMI 6.1%\par       from freestyle lynda download has highest at 191 around 3pm when he has meal before work. \par       Is counting carbs after seeing nutritionist Evon Hernandez. \par \par \par        Microvascular complications: \par        Nephropathy Cr 0.93, EGFR >60, wnl\par        Neuropathy symptoms, denies\par        Sees Dr. Shaikh podiatrist\par        Retinopathy yes Sees Dr. [facility] in [place] denies DRJessica Last visit 1/24\par        Macrovascular complications: \par        HTN at goal on Ramipril. \par        Will start ASA 81mg PO QDaily. \par        CAD/CVA denies \par        Lipids WNL on Crestor 10mg qhs with LFT's normal \par        thyroid US 9/15 no nodules reviewed\par \par \par \par   \par \par \par \par \par

## 2022-05-09 NOTE — ASSESSMENT
[Diabetes Foot Care] : diabetes foot care [Long Term Vascular Complications] : long term vascular complications of diabetes [Carbohydrate Consistent Diet] : carbohydrate consistent diet [Importance of Diet and Exercise] : importance of diet and exercise to improve glycemic control, achieve weight loss and improve cardiovascular health [Exercise/Effect on Glucose] : exercise/effect on glucose [Self Monitoring of Blood Glucose] : self monitoring of blood glucose [Retinopathy Screening] : Patient was referred to ophthalmology for retinopathy screening

## 2022-05-13 ENCOUNTER — APPOINTMENT (OUTPATIENT)
Dept: INTERNAL MEDICINE | Facility: CLINIC | Age: 37
End: 2022-05-13
Payer: COMMERCIAL

## 2022-05-13 VITALS
HEIGHT: 73 IN | HEART RATE: 82 BPM | RESPIRATION RATE: 16 BRPM | BODY MASS INDEX: 22.53 KG/M2 | SYSTOLIC BLOOD PRESSURE: 118 MMHG | OXYGEN SATURATION: 98 % | WEIGHT: 170 LBS | DIASTOLIC BLOOD PRESSURE: 66 MMHG

## 2022-05-13 DIAGNOSIS — R07.89 OTHER CHEST PAIN: ICD-10-CM

## 2022-05-13 PROCEDURE — 99214 OFFICE O/P EST MOD 30 MIN: CPT

## 2022-05-18 PROBLEM — R07.89 CHEST PAIN, ATYPICAL: Status: ACTIVE | Noted: 2022-05-18

## 2022-05-18 NOTE — HISTORY OF PRESENT ILLNESS
[FreeTextEntry1] : ER follow up for chest pain [de-identified] : 1. one episode of chest pain\par 2. work up done in ER was negative, chest pain has not returned since

## 2022-06-15 ENCOUNTER — APPOINTMENT (OUTPATIENT)
Dept: INTERNAL MEDICINE | Facility: CLINIC | Age: 37
End: 2022-06-15
Payer: COMMERCIAL

## 2022-06-15 VITALS
BODY MASS INDEX: 23.06 KG/M2 | DIASTOLIC BLOOD PRESSURE: 80 MMHG | SYSTOLIC BLOOD PRESSURE: 110 MMHG | HEART RATE: 76 BPM | HEIGHT: 73 IN | WEIGHT: 174 LBS | OXYGEN SATURATION: 99 %

## 2022-06-15 DIAGNOSIS — U07.1 COVID-19: ICD-10-CM

## 2022-06-15 PROCEDURE — 99395 PREV VISIT EST AGE 18-39: CPT | Mod: 25

## 2022-06-15 PROCEDURE — 36415 COLL VENOUS BLD VENIPUNCTURE: CPT

## 2022-06-15 PROCEDURE — G0442 ANNUAL ALCOHOL SCREEN 15 MIN: CPT

## 2022-06-23 LAB
25(OH)D3 SERPL-MCNC: 32.1 NG/ML
ALBUMIN SERPL ELPH-MCNC: 5 G/DL
ALP BLD-CCNC: 62 U/L
ALT SERPL-CCNC: 25 U/L
ANION GAP SERPL CALC-SCNC: 12 MMOL/L
AST SERPL-CCNC: 25 U/L
BASOPHILS # BLD AUTO: 0.06 K/UL
BASOPHILS NFR BLD AUTO: 1.2 %
BILIRUB SERPL-MCNC: 0.3 MG/DL
BUN SERPL-MCNC: 13 MG/DL
CALCIUM SERPL-MCNC: 10.1 MG/DL
CHLORIDE SERPL-SCNC: 102 MMOL/L
CHOLEST SERPL-MCNC: 125 MG/DL
CO2 SERPL-SCNC: 25 MMOL/L
COVID-19 NUCLEOCAPSID  GAM ANTIBODY INTERPRETATION: NEGATIVE
COVID-19 SPIKE DOMAIN ANTIBODY INTERPRETATION: POSITIVE
CREAT SERPL-MCNC: 0.85 MG/DL
EGFR: 114 ML/MIN/1.73M2
EOSINOPHIL # BLD AUTO: 0.47 K/UL
EOSINOPHIL NFR BLD AUTO: 9.6 %
ESTIMATED AVERAGE GLUCOSE: 140 MG/DL
GLUCOSE SERPL-MCNC: 148 MG/DL
HBA1C MFR BLD HPLC: 6.5 %
HCT VFR BLD CALC: 44.1 %
HCV AB SER QL: NONREACTIVE
HCV S/CO RATIO: 0.13 S/CO
HDLC SERPL-MCNC: 42 MG/DL
HGB BLD-MCNC: 14.3 G/DL
HIV1+2 AB SPEC QL IA.RAPID: NONREACTIVE
IMM GRANULOCYTES NFR BLD AUTO: 0.2 %
LDLC SERPL CALC-MCNC: 72 MG/DL
LYMPHOCYTES # BLD AUTO: 1.72 K/UL
LYMPHOCYTES NFR BLD AUTO: 35.2 %
MAN DIFF?: NORMAL
MCHC RBC-ENTMCNC: 30.8 PG
MCHC RBC-ENTMCNC: 32.4 GM/DL
MCV RBC AUTO: 94.8 FL
MONOCYTES # BLD AUTO: 0.4 K/UL
MONOCYTES NFR BLD AUTO: 8.2 %
NEUTROPHILS # BLD AUTO: 2.23 K/UL
NEUTROPHILS NFR BLD AUTO: 45.6 %
NONHDLC SERPL-MCNC: 83 MG/DL
PLATELET # BLD AUTO: 243 K/UL
POTASSIUM SERPL-SCNC: 4.7 MMOL/L
PROT SERPL-MCNC: 7.6 G/DL
RBC # BLD: 4.65 M/UL
RBC # FLD: 12.2 %
SARS-COV-2 AB SERPL IA-ACNC: >250 U/ML
SARS-COV-2 AB SERPL QL IA: 0.07 INDEX
SODIUM SERPL-SCNC: 139 MMOL/L
T4 SERPL-MCNC: 7 UG/DL
TRIGL SERPL-MCNC: 59 MG/DL
TSH SERPL-ACNC: 2.52 UIU/ML
VIT B12 SERPL-MCNC: 423 PG/ML
WBC # FLD AUTO: 4.89 K/UL

## 2022-08-09 ENCOUNTER — LABORATORY RESULT (OUTPATIENT)
Age: 37
End: 2022-08-09

## 2022-08-09 ENCOUNTER — APPOINTMENT (OUTPATIENT)
Dept: INTERNAL MEDICINE | Facility: CLINIC | Age: 37
End: 2022-08-09

## 2022-08-09 DIAGNOSIS — M25.50 PAIN IN UNSPECIFIED JOINT: ICD-10-CM

## 2022-08-09 PROCEDURE — 36415 COLL VENOUS BLD VENIPUNCTURE: CPT

## 2022-08-09 PROCEDURE — 99213 OFFICE O/P EST LOW 20 MIN: CPT | Mod: 25

## 2022-08-19 LAB
B BURGDOR DNA SPEC QL NAA+PROBE: NORMAL
TESTOST FREE SERPL-MCNC: 16 PG/ML
TESTOST SERPL-MCNC: 499 NG/DL

## 2022-09-20 ENCOUNTER — APPOINTMENT (OUTPATIENT)
Dept: ENDOCRINOLOGY | Facility: CLINIC | Age: 37
End: 2022-09-20

## 2022-10-19 ENCOUNTER — APPOINTMENT (OUTPATIENT)
Dept: INTERNAL MEDICINE | Facility: CLINIC | Age: 37
End: 2022-10-19

## 2022-10-19 VITALS
DIASTOLIC BLOOD PRESSURE: 80 MMHG | WEIGHT: 186 LBS | BODY MASS INDEX: 24.65 KG/M2 | OXYGEN SATURATION: 98 % | HEIGHT: 73 IN | HEART RATE: 86 BPM | SYSTOLIC BLOOD PRESSURE: 120 MMHG

## 2022-10-19 DIAGNOSIS — R21 RASH AND OTHER NONSPECIFIC SKIN ERUPTION: ICD-10-CM

## 2022-10-19 PROCEDURE — 99213 OFFICE O/P EST LOW 20 MIN: CPT

## 2022-10-19 NOTE — HEALTH RISK ASSESSMENT
[Former] : Former [10-14] : 10-14 [Yes] : Yes [Monthly or less (1 pt)] : Monthly or less (1 point) [1 or 2 (0 pts)] : 1 or 2 (0 points) [Never (0 pts)] : Never (0 points) [0] : 2) Feeling down, depressed, or hopeless: Not at all (0) [PHQ-2 Negative - No further assessment needed] : PHQ-2 Negative - No further assessment needed [PHQ-9 Negative - No further assessment needed] : PHQ-9 Negative - No further assessment needed [YearQuit] : 2018 [Audit-CScore] : 1 [[id number]] : 0

## 2022-10-19 NOTE — HISTORY OF PRESENT ILLNESS
[FreeTextEntry1] : Rash [de-identified] : Reports rash in his armpits and groin started 2 weeks ago, does not itch, burn or hurt, he lives with his wife who does not have rash, no preceding illness, He initially attributed the rash to chaffing from uncomfortable pants. \par no new soaps, detergents, deodorant or other products, \par He has recently started bike riding

## 2022-10-19 NOTE — PHYSICAL EXAM
[Normal] : no acute distress, well nourished, well developed and well-appearing [de-identified] : Groin: irregular erythematous patches with scaling, central clearing and satellite lesions over BL medial thigh, sparing the scrotum BL axillae: 1cm annular lesions with scaling.

## 2022-10-31 ENCOUNTER — TRANSCRIPTION ENCOUNTER (OUTPATIENT)
Age: 37
End: 2022-10-31

## 2022-11-01 ENCOUNTER — TRANSCRIPTION ENCOUNTER (OUTPATIENT)
Age: 37
End: 2022-11-01

## 2022-11-16 LAB
ALBUMIN SERPL ELPH-MCNC: 5 G/DL
ALP BLD-CCNC: 63 U/L
ALT SERPL-CCNC: 36 U/L
ANION GAP SERPL CALC-SCNC: 12 MMOL/L
AST SERPL-CCNC: 29 U/L
BILIRUB SERPL-MCNC: 0.4 MG/DL
BUN SERPL-MCNC: 17 MG/DL
CALCIUM SERPL-MCNC: 9.6 MG/DL
CHLORIDE SERPL-SCNC: 103 MMOL/L
CHOLEST SERPL-MCNC: 132 MG/DL
CO2 SERPL-SCNC: 25 MMOL/L
CREAT SERPL-MCNC: 0.93 MG/DL
CREAT SPEC-SCNC: 144 MG/DL
EGFR: 107 ML/MIN/1.73M2
ESTIMATED AVERAGE GLUCOSE: 160 MG/DL
GLUCOSE SERPL-MCNC: 163 MG/DL
HBA1C MFR BLD HPLC: 7.2 %
HDLC SERPL-MCNC: 44 MG/DL
LDLC SERPL CALC-MCNC: 79 MG/DL
MICROALBUMIN 24H UR DL<=1MG/L-MCNC: 4 MG/DL
MICROALBUMIN/CREAT 24H UR-RTO: 28 MG/G
NONHDLC SERPL-MCNC: 88 MG/DL
POTASSIUM SERPL-SCNC: 4.8 MMOL/L
PROT SERPL-MCNC: 7.4 G/DL
SODIUM SERPL-SCNC: 140 MMOL/L
TRIGL SERPL-MCNC: 49 MG/DL

## 2022-12-16 ENCOUNTER — LABORATORY RESULT (OUTPATIENT)
Age: 37
End: 2022-12-16

## 2022-12-19 ENCOUNTER — APPOINTMENT (OUTPATIENT)
Dept: ENDOCRINOLOGY | Facility: CLINIC | Age: 37
End: 2022-12-19

## 2022-12-19 VITALS
HEART RATE: 82 BPM | BODY MASS INDEX: 24.39 KG/M2 | HEIGHT: 73 IN | DIASTOLIC BLOOD PRESSURE: 80 MMHG | SYSTOLIC BLOOD PRESSURE: 118 MMHG | WEIGHT: 184 LBS | OXYGEN SATURATION: 98 %

## 2022-12-19 LAB
ALBUMIN SERPL ELPH-MCNC: 4.9 G/DL
ALP BLD-CCNC: 57 U/L
ALT SERPL-CCNC: 39 U/L
ANION GAP SERPL CALC-SCNC: 12 MMOL/L
AST SERPL-CCNC: 26 U/L
BILIRUB SERPL-MCNC: 0.3 MG/DL
BUN SERPL-MCNC: 11 MG/DL
CALCIUM SERPL-MCNC: 9.5 MG/DL
CHLORIDE SERPL-SCNC: 102 MMOL/L
CO2 SERPL-SCNC: 27 MMOL/L
CREAT SERPL-MCNC: 0.9 MG/DL
CREAT SPEC-SCNC: 207 MG/DL
EGFR: 111 ML/MIN/1.73M2
ESTIMATED AVERAGE GLUCOSE: 154 MG/DL
GLUCOSE BLDC GLUCOMTR-MCNC: 100
GLUCOSE SERPL-MCNC: 163 MG/DL
HBA1C MFR BLD HPLC: 7 %
MICROALBUMIN 24H UR DL<=1MG/L-MCNC: 5.3 MG/DL
MICROALBUMIN/CREAT 24H UR-RTO: 25 MG/G
POTASSIUM SERPL-SCNC: 4.5 MMOL/L
PROT SERPL-MCNC: 7.5 G/DL
SODIUM SERPL-SCNC: 141 MMOL/L

## 2022-12-19 PROCEDURE — 99215 OFFICE O/P EST HI 40 MIN: CPT | Mod: 25

## 2022-12-19 PROCEDURE — 82962 GLUCOSE BLOOD TEST: CPT

## 2022-12-19 PROCEDURE — 95251 CONT GLUC MNTR ANALYSIS I&R: CPT

## 2022-12-19 RX ORDER — INSULIN LISPRO 100 [IU]/ML
100 INJECTION, SOLUTION INTRAVENOUS; SUBCUTANEOUS
Refills: 0 | Status: DISCONTINUED | COMMUNITY
End: 2022-12-19

## 2022-12-19 NOTE — PHYSICAL EXAM
[Over the Past 2 Weeks, Have You Felt Down, Depressed, or Hopeless?] : 1.) Over the past 2 weeks, have you felt down, depressed, or hopeless? No [Over the Past 2 Weeks, Have You Felt Little Interest or Pleasure Doing Things?] : 2.) Over the past 2 weeks, have you felt little interest or pleasure doing things? No

## 2022-12-19 NOTE — HISTORY OF PRESENT ILLNESS
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[employer], coming for f/u OBED, with + Microal/creat ratio, mild BDR sees [facility] \par \par     last A1c better at 6.5 he had to restart Humalog before each meal after he went to his brother birthday party and had more sugar than his blood sugar went as high as 200 after meals\par        Regimen: \par        metformin ER 2 tab twice a day 500mg \par Tresiba none\par Humalog 5-7units/24hrs\par 1-2......1unit......0-2\par \par \par        Wearing Free Style lynda\par        Average Glucose 118 GMI 6.1%\par       from freestyle lynda download has highest at 191 around 3pm when he has meal before work. \par       Is counting carbs after seeing nutritionist Evon Hernandez. \par \par \par        Microvascular complications: \par        Nephropathy Cr 0.93, EGFR >60, wnl\par        Neuropathy symptoms, denies\par        Sees Dr. Shaikh podiatrist\par        Retinopathy yes Sees Dr. [facility] in [place] denies DRJessica Last visit 1/24\par        Macrovascular complications: \par        HTN at goal on Ramipril. \par        Will start ASA 81mg PO QDaily. \par        CAD/CVA denies \par        Lipids WNL on Crestor 10mg qhs with LFT's normal \par        thyroid US 9/15 no nodules reviewed\par \par \par \par   \par \par \par \par \par

## 2023-03-15 ENCOUNTER — LABORATORY RESULT (OUTPATIENT)
Age: 38
End: 2023-03-15

## 2023-03-20 ENCOUNTER — APPOINTMENT (OUTPATIENT)
Dept: ENDOCRINOLOGY | Facility: CLINIC | Age: 38
End: 2023-03-20
Payer: COMMERCIAL

## 2023-03-20 VITALS
SYSTOLIC BLOOD PRESSURE: 120 MMHG | HEART RATE: 80 BPM | DIASTOLIC BLOOD PRESSURE: 82 MMHG | OXYGEN SATURATION: 98 % | HEIGHT: 73 IN | WEIGHT: 186 LBS | BODY MASS INDEX: 24.65 KG/M2

## 2023-03-20 DIAGNOSIS — E13.9 OTHER SPECIFIED DIABETES MELLITUS W/OUT COMPLICATIONS: ICD-10-CM

## 2023-03-20 LAB — GLUCOSE BLDC GLUCOMTR-MCNC: 114

## 2023-03-20 PROCEDURE — 99491 CHRNC CARE MGMT PHYS 1ST 30: CPT | Mod: 25

## 2023-03-20 PROCEDURE — 82962 GLUCOSE BLOOD TEST: CPT

## 2023-03-20 PROCEDURE — 99215 OFFICE O/P EST HI 40 MIN: CPT | Mod: 25

## 2023-03-20 PROCEDURE — 95251 CONT GLUC MNTR ANALYSIS I&R: CPT

## 2023-03-20 NOTE — ADDENDUM
[FreeTextEntry1] : Patient here for nurse visit for education on Mounjaro 2.5 mg/0.5mL. Patient educated on drug indication, administration, common side effects, rotation of sites, medication storage and sharps disposal; provided patient with written information for future reference. Patient verbalized understanding as evidenced by patient teach back method and return demonstration by patient provided on demonstration pen injector.  Patient provided with number ([phone])  to call office in case of questions/concerns.

## 2023-03-20 NOTE — HISTORY OF PRESENT ILLNESS
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[employer], coming for f/u OBED, with + Microal/creat ratio, mild BDR sees [facility] \par Was diagnosed with diabetes a 18-year-old when his sugar was 700 no other hospital admissions for diabetes afterwards\par \par     last A1c better at 6.5 he had to restart Humalog before each meal after he went to his brother birthday party and had more sugar than his blood sugar went as high as 200 after meals\par        Regimen: \par        metformin ER 2 tab twice a day 500mg \par Tresiba none\par Humalog 5-7units/24hrs has days with no insulin \par 1-2......1unit......0-2\par \par \par        Wearing Free Style lynda\par        Average Glucose 118 GMI 6.1%\par       from freestyle lynda download has highest at 191 around 3pm when he has meal before work. \par       Is counting carbs after seeing nutritionist Evon Hernandez. \par \par \par        Microvascular complications: \par        Nephropathy Cr 0.93, EGFR >60, wnl\par        Neuropathy symptoms, denies\par        Sees Dr. Shaikh podiatrist\par        Retinopathy yes Sees Dr. [facility] in [place] denies  Last visit 1/24\par        Macrovascular complications: \par        HTN at goal on Ramipril. \par        Will start ASA 81mg PO QDaily. \par        CAD/CVA denies \par        Lipids WNL on Crestor 10mg qhs with LFT's normal \par        thyroid US 9/15 no nodules reviewed\par \par \par \par   \par \par \par \par \par

## 2023-04-17 ENCOUNTER — NON-APPOINTMENT (OUTPATIENT)
Age: 38
End: 2023-04-17

## 2023-05-17 RX ORDER — TIRZEPATIDE 5 MG/.5ML
5 INJECTION, SOLUTION SUBCUTANEOUS
Qty: 1 | Refills: 0 | Status: DISCONTINUED | COMMUNITY
Start: 2023-03-20 | End: 2023-05-17

## 2023-06-15 ENCOUNTER — APPOINTMENT (OUTPATIENT)
Dept: ENDOCRINOLOGY | Facility: CLINIC | Age: 38
End: 2023-06-15

## 2023-09-07 ENCOUNTER — TRANSCRIPTION ENCOUNTER (OUTPATIENT)
Age: 38
End: 2023-09-07

## 2023-09-11 ENCOUNTER — APPOINTMENT (OUTPATIENT)
Dept: ENDOCRINOLOGY | Facility: CLINIC | Age: 38
End: 2023-09-11
Payer: COMMERCIAL

## 2023-09-11 VITALS
BODY MASS INDEX: 23.33 KG/M2 | WEIGHT: 176 LBS | SYSTOLIC BLOOD PRESSURE: 110 MMHG | OXYGEN SATURATION: 99 % | HEART RATE: 81 BPM | DIASTOLIC BLOOD PRESSURE: 60 MMHG | HEIGHT: 73 IN

## 2023-09-11 DIAGNOSIS — I10 ESSENTIAL (PRIMARY) HYPERTENSION: ICD-10-CM

## 2023-09-11 LAB
25(OH)D3 SERPL-MCNC: 32.8 NG/ML
ALBUMIN SERPL ELPH-MCNC: 5 G/DL
ALP BLD-CCNC: 65 U/L
ALT SERPL-CCNC: 36 U/L
ANION GAP SERPL CALC-SCNC: 12 MMOL/L
AST SERPL-CCNC: 27 U/L
BILIRUB SERPL-MCNC: 0.3 MG/DL
BUN SERPL-MCNC: 19 MG/DL
CALCIUM SERPL-MCNC: 9.7 MG/DL
CHLORIDE SERPL-SCNC: 102 MMOL/L
CHOLEST SERPL-MCNC: 140 MG/DL
CO2 SERPL-SCNC: 26 MMOL/L
CREAT SERPL-MCNC: 0.89 MG/DL
CREAT SPEC-SCNC: 132 MG/DL
EGFR: 111 ML/MIN/1.73M2
ESTIMATED AVERAGE GLUCOSE: 160 MG/DL
GLUCOSE SERPL-MCNC: 141 MG/DL
HBA1C MFR BLD HPLC: 7.2 %
HDLC SERPL-MCNC: 51 MG/DL
LDLC SERPL CALC-MCNC: 72 MG/DL
MICROALBUMIN 24H UR DL<=1MG/L-MCNC: 2.9 MG/DL
MICROALBUMIN/CREAT 24H UR-RTO: 22 MG/G
NONHDLC SERPL-MCNC: 89 MG/DL
POTASSIUM SERPL-SCNC: 4.5 MMOL/L
PROT SERPL-MCNC: 7.8 G/DL
SODIUM SERPL-SCNC: 139 MMOL/L
TRIGL SERPL-MCNC: 87 MG/DL

## 2023-09-11 PROCEDURE — 95251 CONT GLUC MNTR ANALYSIS I&R: CPT

## 2023-09-11 PROCEDURE — 99215 OFFICE O/P EST HI 40 MIN: CPT | Mod: 25

## 2023-09-11 RX ORDER — RAMIPRIL 5 MG/1
5 CAPSULE ORAL
Qty: 90 | Refills: 3 | Status: ACTIVE | COMMUNITY
Start: 2019-10-04 | End: 1900-01-01

## 2023-09-11 RX ORDER — PEN NEEDLE, DIABETIC 29 G X1/2"
32G X 4 MM NEEDLE, DISPOSABLE MISCELLANEOUS
Qty: 3 | Refills: 3 | Status: ACTIVE | COMMUNITY
Start: 2019-01-21 | End: 1900-01-01

## 2023-10-17 ENCOUNTER — NON-APPOINTMENT (OUTPATIENT)
Age: 38
End: 2023-10-17

## 2023-11-17 ENCOUNTER — RX RENEWAL (OUTPATIENT)
Age: 38
End: 2023-11-17

## 2023-11-17 RX ORDER — FLASH GLUCOSE SENSOR
KIT MISCELLANEOUS
Qty: 6 | Refills: 3 | Status: ACTIVE | COMMUNITY
Start: 2022-05-09 | End: 1900-01-01

## 2023-12-04 ENCOUNTER — LABORATORY RESULT (OUTPATIENT)
Age: 38
End: 2023-12-04

## 2023-12-07 ENCOUNTER — APPOINTMENT (OUTPATIENT)
Dept: ENDOCRINOLOGY | Facility: CLINIC | Age: 38
End: 2023-12-07
Payer: COMMERCIAL

## 2023-12-07 PROCEDURE — 99215 OFFICE O/P EST HI 40 MIN: CPT | Mod: 95

## 2023-12-20 ENCOUNTER — RX RENEWAL (OUTPATIENT)
Age: 38
End: 2023-12-20

## 2024-02-09 ENCOUNTER — RX RENEWAL (OUTPATIENT)
Age: 39
End: 2024-02-09

## 2024-02-09 RX ORDER — ROSUVASTATIN CALCIUM 10 MG/1
10 TABLET, FILM COATED ORAL
Qty: 90 | Refills: 2 | Status: ACTIVE | COMMUNITY
Start: 2019-12-12 | End: 1900-01-01

## 2024-02-10 RX ORDER — ERGOCALCIFEROL 1.25 MG/1
1.25 MG CAPSULE, LIQUID FILLED ORAL
Qty: 24 | Refills: 1 | Status: ACTIVE | COMMUNITY
Start: 2019-09-05 | End: 1900-01-01

## 2024-02-29 ENCOUNTER — NON-APPOINTMENT (OUTPATIENT)
Age: 39
End: 2024-02-29

## 2024-02-29 ENCOUNTER — APPOINTMENT (OUTPATIENT)
Dept: INTERNAL MEDICINE | Facility: CLINIC | Age: 39
End: 2024-02-29
Payer: COMMERCIAL

## 2024-02-29 VITALS
HEIGHT: 73 IN | BODY MASS INDEX: 23.19 KG/M2 | WEIGHT: 175 LBS | DIASTOLIC BLOOD PRESSURE: 80 MMHG | HEART RATE: 82 BPM | TEMPERATURE: 98.1 F | SYSTOLIC BLOOD PRESSURE: 122 MMHG | OXYGEN SATURATION: 98 % | RESPIRATION RATE: 16 BRPM

## 2024-02-29 DIAGNOSIS — Z00.00 ENCOUNTER FOR GENERAL ADULT MEDICAL EXAMINATION W/OUT ABNORMAL FINDINGS: ICD-10-CM

## 2024-02-29 PROCEDURE — 99396 PREV VISIT EST AGE 40-64: CPT

## 2024-02-29 PROCEDURE — 93000 ELECTROCARDIOGRAM COMPLETE: CPT | Mod: 59

## 2024-02-29 PROCEDURE — G0444 DEPRESSION SCREEN ANNUAL: CPT | Mod: 59

## 2024-02-29 RX ORDER — INSULIN LISPRO 100 [IU]/ML
100 INJECTION, SOLUTION INTRAVENOUS; SUBCUTANEOUS
Refills: 0 | Status: ACTIVE | COMMUNITY

## 2024-02-29 RX ORDER — MICONAZOLE
POWDER (GRAM) MISCELLANEOUS
Qty: 1 | Refills: 0 | Status: DISCONTINUED | COMMUNITY
Start: 2022-10-19 | End: 2024-02-29

## 2024-02-29 RX ORDER — CLOTRIMAZOLE 10 MG/G
1 CREAM TOPICAL TWICE DAILY
Qty: 1 | Refills: 0 | Status: DISCONTINUED | COMMUNITY
Start: 2022-10-19 | End: 2024-02-29

## 2024-02-29 RX ORDER — INSULIN LISPRO 100 [IU]/ML
100 INJECTION, SOLUTION INTRAVENOUS; SUBCUTANEOUS
Qty: 3 | Refills: 1 | Status: DISCONTINUED | COMMUNITY
Start: 2022-12-19 | End: 2024-02-29

## 2024-03-01 NOTE — HEALTH RISK ASSESSMENT
[Very Good] : ~his/her~ current health as very good [Monthly or less (1 pt)] : Monthly or less (1 point) [Yes] : Yes [1 or 2 (0 pts)] : 1 or 2 (0 points) [Never (0 pts)] : Never (0 points) [No] : In the past 12 months have you used drugs other than those required for medical reasons? No [No falls in past year] : Patient reported no falls in the past year [0] : 2) Feeling down, depressed, or hopeless: Not at all (0) [PHQ-2 Negative - No further assessment needed] : PHQ-2 Negative - No further assessment needed [Fully functional (bathing, dressing, toileting, transferring, walking, feeding)] : Fully functional (bathing, dressing, toileting, transferring, walking, feeding) [Fully functional (using the telephone, shopping, preparing meals, housekeeping, doing laundry, using] : Fully functional and needs no help or supervision to perform IADLs (using the telephone, shopping, preparing meals, housekeeping, doing laundry, using transportation, managing medications and managing finances) [Never] : Never [Audit-CScore] : 1 [[id number]] : 0 [Smoke Detector] : smoke detector [Safety elements used in home] : safety elements used in home [Sunscreen] : uses sunscreen

## 2024-03-01 NOTE — HISTORY OF PRESENT ILLNESS
[FreeTextEntry1] : Annual exam [de-identified] : Patient is here for annual exam.  He is a chronic diabetic.  He is being followed every 3 months by Dr. Radhika Baptiste.  He does not want to do any blood work today.  He gets it done at [facility].  And he would prefer to do fasting blood work.  He denies any chest pain denies any abdominal pain.  He reports his mental health is stable.  He works at [employer].  He reports he sleeps well.  And exercises on a regular basis.

## 2024-04-15 ENCOUNTER — APPOINTMENT (OUTPATIENT)
Dept: ENDOCRINOLOGY | Facility: CLINIC | Age: 39
End: 2024-04-15
Payer: COMMERCIAL

## 2024-04-15 VITALS
HEART RATE: 84 BPM | OXYGEN SATURATION: 97 % | WEIGHT: 182 LBS | BODY MASS INDEX: 24.12 KG/M2 | DIASTOLIC BLOOD PRESSURE: 78 MMHG | HEIGHT: 73 IN | SYSTOLIC BLOOD PRESSURE: 114 MMHG

## 2024-04-15 DIAGNOSIS — E78.5 HYPERLIPIDEMIA, UNSPECIFIED: ICD-10-CM

## 2024-04-15 DIAGNOSIS — E04.0 NONTOXIC DIFFUSE GOITER: ICD-10-CM

## 2024-04-15 DIAGNOSIS — E13.9 OTHER SPECIFIED DIABETES MELLITUS W/OUT COMPLICATIONS: ICD-10-CM

## 2024-04-15 DIAGNOSIS — E66.3 OVERWEIGHT: ICD-10-CM

## 2024-04-15 DIAGNOSIS — E55.9 VITAMIN D DEFICIENCY, UNSPECIFIED: ICD-10-CM

## 2024-04-15 LAB
25(OH)D3 SERPL-MCNC: 44.2 NG/ML
ALBUMIN SERPL ELPH-MCNC: 4.9 G/DL
ALP BLD-CCNC: 69 U/L
ALT SERPL-CCNC: 58 U/L
ANION GAP SERPL CALC-SCNC: 13 MMOL/L
AST SERPL-CCNC: 34 U/L
BILIRUB SERPL-MCNC: 0.4 MG/DL
BUN SERPL-MCNC: 17 MG/DL
CALCIUM SERPL-MCNC: 9.3 MG/DL
CHLORIDE SERPL-SCNC: 103 MMOL/L
CHOLEST SERPL-MCNC: 147 MG/DL
CO2 SERPL-SCNC: 24 MMOL/L
CREAT SERPL-MCNC: 0.94 MG/DL
EGFR: 105 ML/MIN/1.73M2
ESTIMATED AVERAGE GLUCOSE: 171 MG/DL
GLUCOSE BLDC GLUCOMTR-MCNC: 123
GLUCOSE SERPL-MCNC: 150 MG/DL
HBA1C MFR BLD HPLC: 7.6 %
HDLC SERPL-MCNC: 41 MG/DL
LDLC SERPL CALC-MCNC: 94 MG/DL
NONHDLC SERPL-MCNC: 106 MG/DL
POTASSIUM SERPL-SCNC: 4.3 MMOL/L
PROT SERPL-MCNC: 7.7 G/DL
SODIUM SERPL-SCNC: 140 MMOL/L
TRIGL SERPL-MCNC: 53 MG/DL

## 2024-04-15 PROCEDURE — 95251 CONT GLUC MNTR ANALYSIS I&R: CPT

## 2024-04-15 PROCEDURE — 99215 OFFICE O/P EST HI 40 MIN: CPT | Mod: 25

## 2024-04-15 RX ORDER — GLIPIZIDE 2.5 MG/1
2.5 TABLET, EXTENDED RELEASE ORAL
Qty: 90 | Refills: 3 | Status: ACTIVE | COMMUNITY
Start: 2023-09-11

## 2024-04-15 RX ORDER — METFORMIN ER 500 MG 500 MG/1
500 TABLET ORAL
Qty: 2 | Refills: 3 | Status: ACTIVE | COMMUNITY
Start: 2021-04-19 | End: 1900-01-01

## 2024-04-15 NOTE — REVIEW OF SYSTEMS
[Negative] : Heme/Lymph [Diarrhea] : diarrhea [Chest Pain] : no chest pain [Palpitations] : no palpitations [FreeTextEntry7] : On metformin

## 2024-04-15 NOTE — END OF VISIT
[Time Spent: ___ minutes] : I have spent [unfilled] minutes of time on the encounter. [FreeTextEntry3] :  I, Burton Asif, am scribing for and in the presence of Dr. Radhika Cisneros in the following sections: HISTORY OF PRESENT ILLNESS; REVIEW OF SYSTEMS; PHYSICAL EXAM; ASSESSMENT/ PLAN. I, Radhika Cisneros, personally performed the services described in the documentation, reviewed the documentation recorded by the scribe in my presence, and it accurately and completely records my words and actions. 4/15/2024.

## 2024-04-15 NOTE — PHYSICAL EXAM
[Alert] : alert [Well Nourished] : well nourished [No Acute Distress] : no acute distress [Well Developed] : well developed [Normal Sclera/Conjunctiva] : normal sclera/conjunctiva [EOMI] : extra ocular movement intact [No Proptosis] : no proptosis [Normal Oropharynx] : the oropharynx was normal [Thyroid Not Enlarged] : the thyroid was not enlarged [No Thyroid Nodules] : no palpable thyroid nodules [No Respiratory Distress] : no respiratory distress [No Accessory Muscle Use] : no accessory muscle use [Clear to Auscultation] : lungs were clear to auscultation bilaterally [Normal S1, S2] : normal S1 and S2 [Normal Rate] : heart rate was normal [Regular Rhythm] : with a regular rhythm [No Edema] : no peripheral edema [Normal Bowel Sounds] : normal bowel sounds [Not Tender] : non-tender [Not Distended] : not distended [Soft] : abdomen soft [Normal Anterior Cervical Nodes] : no anterior cervical lymphadenopathy [Normal Posterior Cervical Nodes] : no posterior cervical lymphadenopathy [No Spinal Tenderness] : no spinal tenderness [Spine Straight] : spine straight [No Stigmata of Cushings Syndrome] : no stigmata of Cushings Syndrome [Normal Gait] : normal gait [Normal Strength/Tone] : muscle strength and tone were normal [No Rash] : no rash [Normal Reflexes] : deep tendon reflexes were 2+ and symmetric [No Tremors] : no tremors [Oriented x3] : oriented to person, place, and time [Acanthosis Nigricans] : no acanthosis nigricans [Over the Past 2 Weeks, Have You Felt Down, Depressed, or Hopeless?] : 1.) Over the past 2 weeks, have you felt down, depressed, or hopeless? No [Over the Past 2 Weeks, Have You Felt Little Interest or Pleasure Doing Things?] : 2.) Over the past 2 weeks, have you felt little interest or pleasure doing things? No

## 2024-04-15 NOTE — HISTORY OF PRESENT ILLNESS
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[employer], coming for f/u OBED, with + Microal/creat ratio, mild BDR sees [facility]  had COVID 6 weeks ago feeling more SOB did not exercise as much was expecting his A1c to go up Was diagnosed with diabetes a 18-year-old when his sugar was 700 no other hospital admissions for diabetes afterwards      last A1c better at 6.5 he had to restart Humalog before each meal after he went to his brother birthday party and had more sugar than his blood sugar went as high as 200 after meals        Regimen:         metformin ER 1 tab twice a day 500mg  Tresiba none Humalog 5-7units/24hrs has days with no insulin  4......0......4-5          Wearing Free Style dariusz        Average Glucose 118 GMI 6.1%       from freestyle dariusz download has highest at 191 around 3pm when he has meal before work.        Is counting carbs after seeing nutritionist Evon Hernandez.           Microvascular complications:         Nephropathy Cr 0.93, EGFR >60, wnl        Neuropathy symptoms, denies        Sees Dr. Shaikh podiatrist        Retinopathy yes Sees Dr. [facility] in [place] denies DRJessica Last visit 1/24        Macrovascular complications:         HTN at goal on Ramipril.         Will start ASA 81mg PO QDaily.         CAD/CVA denies         Lipids WNL on Crestor 10mg qhs with LFT's normal         thyroid US 9/15 no nodules reviewed  4/15/24 follow up-  A1c increased from 6.9 to 7.6.  FreeStyle Dariusz 2 reviewed. Pt goes as high as 300. Admits he could be dieting and exercising better. He has gained 7 lbs since February.  He also has lows to 60 before bed. Sometimes gets low alerts while driving home from work. Thinks being on his feet all day at work contributes to his low. Doing glipizide 2.5 mg before breakfast. Was taking BID originally, but was going low too much, so we lowered him to one dose.  Takes metformin 500 mg qd with lunch if he does not have breakfast that day. Otherwise, he would take it in the morning with breakfast. Having some diarrhea with the metformin but states it is "not bad."  Tried Mounjaro in the past but was having a lot of side effects and lows.  Willing to try Rybelsus if unable to tolerate higher dose metformin first.

## 2024-05-08 RX ORDER — ORAL SEMAGLUTIDE 7 MG/1
7 TABLET ORAL
Qty: 30 | Refills: 2 | Status: ACTIVE | COMMUNITY
Start: 2024-04-15 | End: 1900-01-01

## 2024-07-15 ENCOUNTER — APPOINTMENT (OUTPATIENT)
Dept: ENDOCRINOLOGY | Facility: CLINIC | Age: 39
End: 2024-07-15
Payer: COMMERCIAL

## 2024-07-15 VITALS
SYSTOLIC BLOOD PRESSURE: 122 MMHG | OXYGEN SATURATION: 98 % | HEART RATE: 72 BPM | DIASTOLIC BLOOD PRESSURE: 82 MMHG | WEIGHT: 181 LBS | BODY MASS INDEX: 23.99 KG/M2 | HEIGHT: 73 IN

## 2024-07-15 DIAGNOSIS — E78.5 HYPERLIPIDEMIA, UNSPECIFIED: ICD-10-CM

## 2024-07-15 DIAGNOSIS — E13.9 OTHER SPECIFIED DIABETES MELLITUS W/OUT COMPLICATIONS: ICD-10-CM

## 2024-07-15 DIAGNOSIS — E55.9 VITAMIN D DEFICIENCY, UNSPECIFIED: ICD-10-CM

## 2024-07-15 DIAGNOSIS — I10 ESSENTIAL (PRIMARY) HYPERTENSION: ICD-10-CM

## 2024-07-15 DIAGNOSIS — E04.0 NONTOXIC DIFFUSE GOITER: ICD-10-CM

## 2024-07-15 LAB
ALBUMIN SERPL ELPH-MCNC: 5 G/DL
ALP BLD-CCNC: 63 U/L
ALT SERPL-CCNC: 53 U/L
ANION GAP SERPL CALC-SCNC: 14 MMOL/L
AST SERPL-CCNC: 41 U/L
BILIRUB SERPL-MCNC: 0.5 MG/DL
BUN SERPL-MCNC: 14 MG/DL
CALCIUM SERPL-MCNC: 9.7 MG/DL
CHLORIDE SERPL-SCNC: 99 MMOL/L
CHOLEST SERPL-MCNC: 159 MG/DL
CO2 SERPL-SCNC: 26 MMOL/L
CREAT SERPL-MCNC: 0.87 MG/DL
CREAT SPEC-SCNC: 55 MG/DL
EGFR: 112 ML/MIN/1.73M2
ESTIMATED AVERAGE GLUCOSE: 197 MG/DL
GLUCOSE BLDC GLUCOMTR-MCNC: 134
GLUCOSE SERPL-MCNC: 200 MG/DL
HBA1C MFR BLD HPLC: 8.5 %
HDLC SERPL-MCNC: 46 MG/DL
LDLC SERPL CALC-MCNC: 94 MG/DL
MICROALBUMIN 24H UR DL<=1MG/L-MCNC: 2.1 MG/DL
MICROALBUMIN/CREAT 24H UR-RTO: 39 MG/G
NONHDLC SERPL-MCNC: 114 MG/DL
POTASSIUM SERPL-SCNC: 4.2 MMOL/L
PROT SERPL-MCNC: 7.6 G/DL
SODIUM SERPL-SCNC: 140 MMOL/L
TRIGL SERPL-MCNC: 106 MG/DL

## 2024-07-15 PROCEDURE — 82962 GLUCOSE BLOOD TEST: CPT

## 2024-07-15 PROCEDURE — 99215 OFFICE O/P EST HI 40 MIN: CPT | Mod: 25

## 2024-07-15 PROCEDURE — 95251 CONT GLUC MNTR ANALYSIS I&R: CPT

## 2024-08-21 ENCOUNTER — APPOINTMENT (OUTPATIENT)
Dept: ENDOCRINOLOGY | Facility: CLINIC | Age: 39
End: 2024-08-21
Payer: COMMERCIAL

## 2024-08-21 PROCEDURE — 97802 MEDICAL NUTRITION INDIV IN: CPT

## 2024-09-12 ENCOUNTER — RX RENEWAL (OUTPATIENT)
Age: 39
End: 2024-09-12

## 2024-10-03 ENCOUNTER — RX RENEWAL (OUTPATIENT)
Age: 39
End: 2024-10-03

## 2024-10-15 ENCOUNTER — RX RENEWAL (OUTPATIENT)
Age: 39
End: 2024-10-15

## 2024-10-21 ENCOUNTER — APPOINTMENT (OUTPATIENT)
Dept: ENDOCRINOLOGY | Facility: CLINIC | Age: 39
End: 2024-10-21
Payer: COMMERCIAL

## 2024-10-21 VITALS
BODY MASS INDEX: 23.99 KG/M2 | WEIGHT: 181 LBS | DIASTOLIC BLOOD PRESSURE: 78 MMHG | HEIGHT: 73 IN | SYSTOLIC BLOOD PRESSURE: 102 MMHG | OXYGEN SATURATION: 96 % | HEART RATE: 82 BPM

## 2024-10-21 DIAGNOSIS — I10 ESSENTIAL (PRIMARY) HYPERTENSION: ICD-10-CM

## 2024-10-21 DIAGNOSIS — E78.5 HYPERLIPIDEMIA, UNSPECIFIED: ICD-10-CM

## 2024-10-21 DIAGNOSIS — E55.9 VITAMIN D DEFICIENCY, UNSPECIFIED: ICD-10-CM

## 2024-10-21 DIAGNOSIS — E13.9 OTHER SPECIFIED DIABETES MELLITUS W/OUT COMPLICATIONS: ICD-10-CM

## 2024-10-21 DIAGNOSIS — E04.0 NONTOXIC DIFFUSE GOITER: ICD-10-CM

## 2024-10-21 LAB — GLUCOSE BLDC GLUCOMTR-MCNC: 133

## 2024-10-21 PROCEDURE — 95251 CONT GLUC MNTR ANALYSIS I&R: CPT

## 2024-10-21 PROCEDURE — G2211 COMPLEX E/M VISIT ADD ON: CPT

## 2024-10-21 PROCEDURE — 99215 OFFICE O/P EST HI 40 MIN: CPT

## 2024-10-21 RX ORDER — ORAL SEMAGLUTIDE 3 MG/1
3 TABLET ORAL
Qty: 90 | Refills: 3 | Status: ACTIVE | COMMUNITY
Start: 2024-10-21 | End: 1900-01-01

## 2024-12-04 ENCOUNTER — APPOINTMENT (OUTPATIENT)
Dept: ENDOCRINOLOGY | Facility: CLINIC | Age: 39
End: 2024-12-04
Payer: COMMERCIAL

## 2024-12-04 PROCEDURE — 97803 MED NUTRITION INDIV SUBSEQ: CPT

## 2024-12-05 VITALS — HEIGHT: 73 IN | BODY MASS INDEX: 23.86 KG/M2 | WEIGHT: 180 LBS

## 2025-01-22 ENCOUNTER — RX RENEWAL (OUTPATIENT)
Age: 40
End: 2025-01-22

## 2025-01-27 ENCOUNTER — APPOINTMENT (OUTPATIENT)
Dept: ENDOCRINOLOGY | Facility: CLINIC | Age: 40
End: 2025-01-27
Payer: COMMERCIAL

## 2025-01-27 VITALS
OXYGEN SATURATION: 99 % | WEIGHT: 180 LBS | SYSTOLIC BLOOD PRESSURE: 122 MMHG | HEART RATE: 63 BPM | BODY MASS INDEX: 23.86 KG/M2 | HEIGHT: 73 IN | DIASTOLIC BLOOD PRESSURE: 80 MMHG

## 2025-01-27 DIAGNOSIS — E04.0 NONTOXIC DIFFUSE GOITER: ICD-10-CM

## 2025-01-27 DIAGNOSIS — I10 ESSENTIAL (PRIMARY) HYPERTENSION: ICD-10-CM

## 2025-01-27 DIAGNOSIS — E55.9 VITAMIN D DEFICIENCY, UNSPECIFIED: ICD-10-CM

## 2025-01-27 DIAGNOSIS — D64.9 ANEMIA, UNSPECIFIED: ICD-10-CM

## 2025-01-27 DIAGNOSIS — E78.5 HYPERLIPIDEMIA, UNSPECIFIED: ICD-10-CM

## 2025-01-27 DIAGNOSIS — E13.9 OTHER SPECIFIED DIABETES MELLITUS W/OUT COMPLICATIONS: ICD-10-CM

## 2025-01-27 LAB — GLUCOSE BLDC GLUCOMTR-MCNC: 108

## 2025-01-27 PROCEDURE — 99215 OFFICE O/P EST HI 40 MIN: CPT

## 2025-01-27 PROCEDURE — 95251 CONT GLUC MNTR ANALYSIS I&R: CPT

## 2025-01-27 PROCEDURE — G2211 COMPLEX E/M VISIT ADD ON: CPT

## 2025-02-06 ENCOUNTER — NON-APPOINTMENT (OUTPATIENT)
Age: 40
End: 2025-02-06

## 2025-02-26 ENCOUNTER — APPOINTMENT (OUTPATIENT)
Dept: ENDOCRINOLOGY | Facility: CLINIC | Age: 40
End: 2025-02-26
Payer: COMMERCIAL

## 2025-02-26 PROCEDURE — 97803 MED NUTRITION INDIV SUBSEQ: CPT

## 2025-02-27 VITALS — HEIGHT: 73 IN | WEIGHT: 176 LBS | BODY MASS INDEX: 23.33 KG/M2

## 2025-05-06 ENCOUNTER — APPOINTMENT (OUTPATIENT)
Dept: ENDOCRINOLOGY | Facility: CLINIC | Age: 40
End: 2025-05-06
Payer: COMMERCIAL

## 2025-05-06 VITALS
BODY MASS INDEX: 21.87 KG/M2 | DIASTOLIC BLOOD PRESSURE: 80 MMHG | WEIGHT: 165 LBS | HEIGHT: 73 IN | HEART RATE: 83 BPM | SYSTOLIC BLOOD PRESSURE: 126 MMHG | OXYGEN SATURATION: 99 %

## 2025-05-06 DIAGNOSIS — E13.9 OTHER SPECIFIED DIABETES MELLITUS W/OUT COMPLICATIONS: ICD-10-CM

## 2025-05-06 DIAGNOSIS — M25.50 PAIN IN UNSPECIFIED JOINT: ICD-10-CM

## 2025-05-06 DIAGNOSIS — E55.9 VITAMIN D DEFICIENCY, UNSPECIFIED: ICD-10-CM

## 2025-05-06 DIAGNOSIS — E66.3 OVERWEIGHT: ICD-10-CM

## 2025-05-06 DIAGNOSIS — D64.9 ANEMIA, UNSPECIFIED: ICD-10-CM

## 2025-05-06 DIAGNOSIS — I10 ESSENTIAL (PRIMARY) HYPERTENSION: ICD-10-CM

## 2025-05-06 DIAGNOSIS — E78.5 HYPERLIPIDEMIA, UNSPECIFIED: ICD-10-CM

## 2025-05-06 DIAGNOSIS — E04.0 NONTOXIC DIFFUSE GOITER: ICD-10-CM

## 2025-05-06 LAB — GLUCOSE BLDC GLUCOMTR-MCNC: 138

## 2025-05-06 PROCEDURE — G2211 COMPLEX E/M VISIT ADD ON: CPT

## 2025-05-06 PROCEDURE — 95251 CONT GLUC MNTR ANALYSIS I&R: CPT

## 2025-05-06 PROCEDURE — 99215 OFFICE O/P EST HI 40 MIN: CPT

## 2025-05-27 ENCOUNTER — RX RENEWAL (OUTPATIENT)
Age: 40
End: 2025-05-27

## 2025-06-25 ENCOUNTER — APPOINTMENT (OUTPATIENT)
Dept: ENDOCRINOLOGY | Facility: CLINIC | Age: 40
End: 2025-06-25
Payer: COMMERCIAL

## 2025-06-25 VITALS — HEIGHT: 73 IN | BODY MASS INDEX: 20.28 KG/M2 | WEIGHT: 153 LBS

## 2025-06-25 PROCEDURE — 97803 MED NUTRITION INDIV SUBSEQ: CPT

## 2025-08-04 ENCOUNTER — APPOINTMENT (OUTPATIENT)
Dept: ENDOCRINOLOGY | Facility: CLINIC | Age: 40
End: 2025-08-04
Payer: COMMERCIAL

## 2025-08-04 VITALS
SYSTOLIC BLOOD PRESSURE: 114 MMHG | WEIGHT: 156 LBS | OXYGEN SATURATION: 98 % | HEIGHT: 73 IN | BODY MASS INDEX: 20.67 KG/M2 | DIASTOLIC BLOOD PRESSURE: 74 MMHG | HEART RATE: 83 BPM

## 2025-08-04 DIAGNOSIS — E13.9 OTHER SPECIFIED DIABETES MELLITUS W/OUT COMPLICATIONS: ICD-10-CM

## 2025-08-04 DIAGNOSIS — I10 ESSENTIAL (PRIMARY) HYPERTENSION: ICD-10-CM

## 2025-08-04 DIAGNOSIS — E55.9 VITAMIN D DEFICIENCY, UNSPECIFIED: ICD-10-CM

## 2025-08-04 DIAGNOSIS — E04.0 NONTOXIC DIFFUSE GOITER: ICD-10-CM

## 2025-08-04 DIAGNOSIS — D64.9 ANEMIA, UNSPECIFIED: ICD-10-CM

## 2025-08-04 DIAGNOSIS — E78.5 HYPERLIPIDEMIA, UNSPECIFIED: ICD-10-CM

## 2025-08-04 DIAGNOSIS — E66.3 OVERWEIGHT: ICD-10-CM

## 2025-08-04 LAB — GLUCOSE BLDC GLUCOMTR-MCNC: 168

## 2025-08-04 PROCEDURE — 99215 OFFICE O/P EST HI 40 MIN: CPT

## 2025-08-04 PROCEDURE — 95251 CONT GLUC MNTR ANALYSIS I&R: CPT

## 2025-08-04 PROCEDURE — G2211 COMPLEX E/M VISIT ADD ON: CPT

## 2025-09-09 ENCOUNTER — NON-APPOINTMENT (OUTPATIENT)
Age: 40
End: 2025-09-09

## 2025-09-17 ENCOUNTER — APPOINTMENT (OUTPATIENT)
Dept: ENDOCRINOLOGY | Facility: CLINIC | Age: 40
End: 2025-09-17
Payer: COMMERCIAL

## 2025-09-17 PROCEDURE — 97803 MED NUTRITION INDIV SUBSEQ: CPT
